# Patient Record
Sex: FEMALE | Race: WHITE | Employment: UNEMPLOYED | URBAN - METROPOLITAN AREA
[De-identification: names, ages, dates, MRNs, and addresses within clinical notes are randomized per-mention and may not be internally consistent; named-entity substitution may affect disease eponyms.]

---

## 2017-01-02 ENCOUNTER — HOSPITAL ENCOUNTER (OUTPATIENT)
Dept: PHYSICAL THERAPY | Age: 42
Discharge: HOME | End: 2017-01-02
Payer: MEDICARE

## 2017-01-02 PROCEDURE — 97110 THERAPEUTIC EXERCISES: CPT

## 2017-01-02 NOTE — PROGRESS NOTES
Alva Drew   (:1975) [facility] at  [address]  [phone]   [phone]       Outpatient PHYSICAL THERAPY: Daily Note  Fall Risk Score: 2 (? 5 = High Risk)  ICD-10: Treatment Diagnosis: Fibromyalgia (M79.7)                 Cervicalgia (M54.2)           Low back pain (M54.5)           REFERRING PHYSICIAN: Sven Paulino, *  MD Orders: Evaluate and treat   Return Physician Appointment: none specified  MEDICAL/REFERRING DIAGNOSIS: Fibromyalgia [M79.7]  DATE OF ONSET: chronic problem  PRIOR LEVEL OF FUNCTION: independent  PRECAUTIONS/ALLERGIES: buproprion, haldol, latuda, paxil, sulfa, wellbutrin, zoloft  ASSESSMENT:  ?????? ? ? This section established at most recent assessment??????????  Alva Drew is a 37 y.o. female who presents to physical therapy for chronic pain associated with a diagnosis of fibromyalgia. This session, she demonstrated decreased B UE/LE strength/endurance, pain with end range mobility at extremities, decreased functional mobility, and decreased activity tolerance. Pt may benefit from skilled PT to address the above listed deficits to improve ability to perform pain-free ADLs/IADLs and to improve overall quality of life prior to discharge. PROBLEM LIST (Impairments causing functional limitations):  1. Decreased Strength affecting function  2. Edema affecting function  3. Decreased ADL/Functional Activities  4. Decreased Flexibility/joint mobility  5. Decreased transfer abilities  6. Increased Pain affecting function  7. Decreased Activity tolerance   8. Decreased Pacing skills  9. Decreased Work Simplification/Energy Conservation techniques  10. Increased Fatigue affecting function  GOALS: (Goals have been discussed and agreed upon with patient.)  SHORT-TERM FUNCTIONAL GOALS: Time Frame: 4 weeks  1. Patient will be compliant with HEP.   2. Patient will have a decrease in average 24 hour pain level to 4/10 in order to improve functional mobility. 3. Patient will have a decrease on the FIQ to 79 in order to improve their quality of life. 4. Patient will have an increase on the 6 minute walk to 1,200 feet in order to indicate improve endurance. 5. Pataient will verbalize 3 ways to improve body mechanics in order to reduce pain. DISCHARGE GOALS: Time Frame: 8 weeks  1. Patient will be independent with HEP. 2. Patient will have a decrease in average 24 hour pain level to 3/10 in order to improve functional mobility. 3. Patient will have a decrease on the FIQ to 74 in order to improve their quality of life. 4. Patient will have a decrease on the fatigue scale to 67 in order to complete ADLs and IADLs. 5. Patient will have an increase on the 6 minute walk to 1,300 feet in order to demonstrate improved endurance. REHABILITATION POTENTIAL FOR STATED GOALS: [facility] OF CARE:  INTERVENTIONS PLANNED: (Benefits and precautions of physical therapy have been discussed with the patient.)  1. balance exercise  2. bed mobility  3. cold  4. family education  5. gait training  6. heat  7. home exercise program (HEP)  8. manual therapy  9. neuromuscular re-education/strengthening  10. range of motion: active/assisted/passive  11. therapeutic activities  12. therapeutic exercise/strengthening  13. transcutaneous electrical nerve stimulation (TENs)  14. transfer training  15. ultrasound  16. Aquatic therapy  TREATMENT PLAN EFFECTIVE DATES: 12/6/2016 TO 3/6/2016  FREQUENCY/DURATION: Follow patient 2 times a week for 8 weeks to address above goals. Regarding Alva Drew's therapy, I certify that the treatment plan above will be carried out by a therapist or under their direction.   Thank you for this referral,  Mustapha Porter DPT     Referring Physician Signature: Cindi Alves, *          Date           SUBJECTIVE:  History of Present Injury/Illness (Reason for Referral): Pt states she started having chronic pain after she got thrown off of horse when she was in her late 25s. She was diagnosed with fibromyalgia 2 years ago. Pt states she has flare-ups of fibromyalgia when it gets cold (hands, hips, arms, scapular regions), but her lower back hurts all the time. Entire back hurts all the time. States she sometimes gets shooting pains down her anterior legs that start at her hips. Pt states fibromyalgia symptoms significantly worsened 4 months ago when her dad  and she was admitted to [facility]. Pain at worst is 9/10 (aggravating activities include standing/walking for prolonged periods of time, sitting down on surfaces that are too hard/soft, going up/down stairs). Pain at best is almost 0/10 (eases pain with heat, OTC and prescribed pain medications). Present Symptoms: 5/10 pain in B hips (posterior); states it was 8/10 pain this morning; states they started hurting really badly on 2016 ; states she has been taking excedrin and ibuprofen; states the longer she is on her feet (standing/walking), the more her back and hips hurt     Dominant Side: right  Past Medical History: anxiety, bipolar disorder, depression, endocrine disease (hypothyroid), gastrointestinal disorder (esophageal ulcer), hypothyroidism, psychiatric disorder (bipolar), seizures    Current Medications: updated list in paper chart  Current Outpatient Prescriptions on File Prior to Encounter   Medication Sig Dispense Refill    ciprofloxacin HCl (CIPRO) 500 mg tablet Take 1 Tab by mouth two (2) times a day. 20 Tab 0    pantoprazole (PROTONIX) 40 mg tablet Take 40 mg by mouth daily.  QUEtiapine (SEROQUEL) 100 mg tablet Take 150 mg by mouth nightly.  folic acid (FOLVITE) 1 mg tablet Take  by mouth daily.  risperiDONE (RISPERDAL) 2 mg tablet Take 2 mg by mouth nightly.  clonazePAM (KLONOPIN) 0.5 mg tablet Take 0.5 mg by mouth three (3) times daily.       gabapentin (NEURONTIN) 300 mg capsule Take 300 mg by mouth three (3) times daily as needed.  carBAMazepine XR (TEGRETOL XR) 200 mg SR tablet Take 200 mg by mouth two (2) times a day. 200 mg in am; 400 mg at night      meloxicam (MOBIC) 15 mg tablet Take 15 mg by mouth daily.  lamotrigine (LAMICTAL) 100 mg tablet Take 200 mg by mouth daily.  PRAZOSIN 4 mg by Does Not Apply route nightly. Does not know the dose takes 3 pills a day      lithium carbonate (LITHOBID) 150 mg capsule Take 600 mg by mouth two (2) times daily (with meals).  SYNTHROID 75 mcg Tab take 75 mcg by mouth daily. No current facility-administered medications on file prior to encounter. Date Last Reviewed: 1/2/2017   Social History/Home Situation: 15 steps unilateral railing to get into apartment   Work/Activity History: on disability (in 2000)  OBJECTIVE:  Initial assessment on 12/6/2016  Tool Used: Fibromyalgia Impact Questionnaire  Score:  Initial: 84.84 Most Recent: X (Date: -- )   Interpretation of Score: <40 = Mild Fibromyalgia Syndrome   40-60 = Moderate Fibromyalgia Syndrome   >60-70 = Severe Fibromyalgia Syndrome  Score 0 1-18 19-37 38-57 58-77 78-96 97   Modifier CH CI CJ CK CL CM CN     ? Mobility - Walking and Moving Around:     - CURRENT STATUS: CM - 80%-99% impaired, limited or restricted    - GOAL STATUS:  CL - 60%-79% impaired, limited or restricted    - D/C STATUS:  ---------------To be determined---------------  [place] / [place] / Product Type: Medicare /      Outcome Measure: Tool Used: Modified Fatigue Impact Scale  Score:  Initial: 77 Most Recent:   Interpretation of Score: The higher the score equates to a higher level of perceived fatigue. Outcome Measure:    Tool Used: Beck Depression Index  Score:  Initial: 28 Most Recent:   Interpretation of Score:  0-9 = No Depression   10-19 = Mild Depression   19-29 = Moderate Depression   >30 = Severe Depression    Observation/Orthostatic Postural Assessment: kyphotic posture, forward head, rounded shoulders  Palpation: significant pain with slight pressure along cervical and lower thoracic and lumbar paraspinals and vertebrae  ROM: WFL but painful at end range         Strength:   Shoulder flexion 4-/5 (pulling pain in center of neck)  Shoulder abduction 4+/5 (pulling pain in center of neck)  Elbow flexion 5/5 (pulling pain in center of neck)  Elbow extension 5/5 (pulling pain in center of neck) Hip flexion 4+/5 (pain in neck due to pt bracing)  Hip abduction NT  Hip extension NT  Knee extension 5/5 (slight pain in anterior R thigh)  Knee flexion NT  Dorsiflexion 4+/5  Plantarflexion 4+/5         Neurological Screen:   Myotomes: WFL  Dermatomes: pt reporting occasional tingling in B hands    Functional Mobility: Patient ambulated 1,072 eet during 6 minute walk test with 0 rest breaks. Balance: good in sitting; good in standing  TREATMENT:   (In addition to Assessment/Re-Assessment sessions the following treatments were rendered)  Therapeutic Exercise: ( 35 minutes):  Exercises per grid below to improve mobility, strength, balance and dynamic movement of arm - bilateral and leg - bilateral to improve functional mobility. Required minimal visual, verbal and manual cues to promote proper body alignment, promote proper body posture and promote proper body mechanics. Progressed resistance, range, repetitions and complexity of movement as indicated.     Date:  12/12/2016 Date:  12/19/2016 Date:  1/2/2017   Activity/Exercise Parameters Parameters    Nustep L2 resistance with B LEs and UEs for 10 minutes to improve blood flow and mobility L2 resistance with B LEs and UEs for 10 minutes to improve blood flow and mobility L2 resistance with B LEs and UEs for 10 minutes to improve blood flow and mobility   LTR in hooklying 20 reps/1 set each direction with 2-3 second hold 20 reps/1 set each direction with 2-3 second hold 20 reps/1 set each direction with 2-3 second hold Hamstring stretch in supine 30 second hold x 3 reps each direction; passive stretch by therapist 30 second hold x 3 reps each direction; passive stretch by therapist 30 second hold x 3 reps each direction; passive stretch by therapist   Piriformis stretch in supine 30 second hold x 3 reps each direction; passive stretch by therapist 30 second hold x 3 reps each direction; passive stretch by therapist 30 second hold x 3 reps each direction; passive stretch by therapist   Posterior pelvic tilt in hooklying 20 reps/1 set with minimal tactile cues for correct movement; 2-3 second hold 20 reps/1 set with minimal tactile cues for correct movement; 2-3 second hold    Transversus abdominus (TA) activation 20 reps/1 set with 2-3 second hold     Bridging in hooklying 15 reps/1 set with posterior pelvic tilts with cues for 2-3 second hold at top 15 reps/1 set with posterior pelvic tilts with cues for 2-3 second hold at top    Postural exercises in sitting  Cues to obtain/maintain upright posture    Abdominal breathing in hooklying  10 reps with cues to breathe in through nose and out through mouth with cues to let belly rise/fall accordingly 10 reps with cues to breathe in through nose and out through mouth with cues to let belly rise/fall accordingly                 HEP: As above; handouts given to patient for all exercises. Patient/Family Education:     [x] Benefits of Exercise    [x] Pain Management    [x] Nutrition    [] Improving sleep    [] Body Mechanics    [] Fatigue    [] Grief/Depression/Stress   Therapeutic Modalities:   ____________________________________________________________________________________________________________  Treatment Assessment:  Pt demonstrating slightly reduced pain after stretches this session. Pt had increased malaise this session, and was trying to hold back tears.  Encouraged pt to not hold back her emotions, and to talk to therapist or family member(s) to avoid holding her emotions in.  Pain at end of treatment: 4/10 pain at end of treatment     Progression/Medical Necessity:   · Patient is expected to demonstrate progress in strength, range of motion, balance and functional technique to improve ability to perform pain-free ADLs/IADLs and to improve overall quality of life. · Patient demonstrates good rehab potential due to higher previous functional level. Compliance with Program/Exercises: Will assess as treatment progresses. Reason for Continuation of Services/Other Comments:  · Patient continues to require modification of therapeutic interventions to increase complexity of exercises. Recommendations/Intent for next treatment session: \"Treatment next visit will focus on advancements to more challenging activities and reduction in assistance provided\".   bFIBRO PROGRAM  Total Treatment Duration:  PT Patient Time In/Time Out  Time In: 1345  Time Out: 615 Santino Aponte DPT

## 2017-01-03 ENCOUNTER — HOSPITAL ENCOUNTER (OUTPATIENT)
Dept: PHYSICAL THERAPY | Age: 42
Discharge: HOME | End: 2017-01-03
Payer: MEDICARE

## 2017-01-03 PROCEDURE — 97113 AQUATIC THERAPY/EXERCISES: CPT

## 2017-01-03 NOTE — PROGRESS NOTES
Alva Drew   (:1975) [facility] at  [address]  [phone]   [phone]       Outpatient PHYSICAL THERAPY: Daily Note and Aquatic Note  Fall Risk Score: 2 (? 5 = High Risk)  ICD-10: Treatment Diagnosis: Fibromyalgia (M79.7)                 Cervicalgia (M54.2)           Low back pain (M54.5)           REFERRING PHYSICIAN: Susannah Hernandez MD Orders: Evaluate and treat   Return Physician Appointment: none specified  MEDICAL/REFERRING DIAGNOSIS: Fibromyalgia [M79.7]  DATE OF ONSET: chronic problem  PRIOR LEVEL OF FUNCTION: independent  PRECAUTIONS/ALLERGIES: buproprion, haldol, latuda, paxil, sulfa, wellbutrin, zoloft  ASSESSMENT:  ?????? ? ? This section established at most recent assessment??????????  Alva Drew is a 37 y.o. female who presents to physical therapy for chronic pain associated with a diagnosis of fibromyalgia. This session, she demonstrated decreased B UE/LE strength/endurance, pain with end range mobility at extremities, decreased functional mobility, and decreased activity tolerance. Pt may benefit from skilled PT to address the above listed deficits to improve ability to perform pain-free ADLs/IADLs and to improve overall quality of life prior to discharge. PROBLEM LIST (Impairments causing functional limitations):  1. Decreased Strength affecting function  2. Edema affecting function  3. Decreased ADL/Functional Activities  4. Decreased Flexibility/joint mobility  5. Decreased transfer abilities  6. Increased Pain affecting function  7. Decreased Activity tolerance   8. Decreased Pacing skills  9. Decreased Work Simplification/Energy Conservation techniques  10. Increased Fatigue affecting function  GOALS: (Goals have been discussed and agreed upon with patient.)  SHORT-TERM FUNCTIONAL GOALS: Time Frame: 4 weeks  1. Patient will be compliant with HEP.   2. Patient will have a decrease in average 24 hour pain level to 4/10 in order to improve functional mobility. 3. Patient will have a decrease on the FIQ to 79 in order to improve their quality of life. 4. Patient will have an increase on the 6 minute walk to 1,200 feet in order to indicate improve endurance. 5. Pataient will verbalize 3 ways to improve body mechanics in order to reduce pain. DISCHARGE GOALS: Time Frame: 8 weeks  1. Patient will be independent with HEP. 2. Patient will have a decrease in average 24 hour pain level to 3/10 in order to improve functional mobility. 3. Patient will have a decrease on the FIQ to 74 in order to improve their quality of life. 4. Patient will have a decrease on the fatigue scale to 67 in order to complete ADLs and IADLs. 5. Patient will have an increase on the 6 minute walk to 1,300 feet in order to demonstrate improved endurance. REHABILITATION POTENTIAL FOR STATED GOALS: [facility] OF CARE:  INTERVENTIONS PLANNED: (Benefits and precautions of physical therapy have been discussed with the patient.)  1. balance exercise  2. bed mobility  3. cold  4. family education  5. gait training  6. heat  7. home exercise program (HEP)  8. manual therapy  9. neuromuscular re-education/strengthening  10. range of motion: active/assisted/passive  11. therapeutic activities  12. therapeutic exercise/strengthening  13. transcutaneous electrical nerve stimulation (TENs)  14. transfer training  15. ultrasound  16. Aquatic therapy  TREATMENT PLAN EFFECTIVE DATES: 12/6/2016 TO 3/6/2016  FREQUENCY/DURATION: Follow patient 2 times a week for 8 weeks to address above goals. Regarding Alva Drew's therapy, I certify that the treatment plan above will be carried out by a therapist or under their direction.   Thank you for this referral,  Meg Riddle PTA     Referring Physician Signature: Mikel Garcia, *          Date           SUBJECTIVE:  History of Present Injury/Illness (Reason for Referral): Pt states she started having chronic pain after she got thrown off of horse when she was in her late 25s. She was diagnosed with fibromyalgia 2 years ago. Pt states she has flare-ups of fibromyalgia when it gets cold (hands, hips, arms, scapular regions), but her lower back hurts all the time. Entire back hurts all the time. States she sometimes gets shooting pains down her anterior legs that start at her hips. Pt states fibromyalgia symptoms significantly worsened 4 months ago when her dad  and she was admitted to [facility]. Pain at worst is 9/10 (aggravating activities include standing/walking for prolonged periods of time, sitting down on surfaces that are too hard/soft, going up/down stairs). Pain at best is almost 0/10 (eases pain with heat, OTC and prescribed pain medications). Present Symptoms: Patient reports her pain to be 4/10. Patient is very talkative and in an extremely good mood per patient. Patient presents laughing and smiling. Dominant Side: right  Past Medical History: anxiety, bipolar disorder, depression, endocrine disease (hypothyroid), gastrointestinal disorder (esophageal ulcer), hypothyroidism, psychiatric disorder (bipolar), seizures    Current Medications: updated list in paper chart  Current Outpatient Prescriptions on File Prior to Encounter   Medication Sig Dispense Refill    ciprofloxacin HCl (CIPRO) 500 mg tablet Take 1 Tab by mouth two (2) times a day. 20 Tab 0    pantoprazole (PROTONIX) 40 mg tablet Take 40 mg by mouth daily.  QUEtiapine (SEROQUEL) 100 mg tablet Take 150 mg by mouth nightly.  folic acid (FOLVITE) 1 mg tablet Take  by mouth daily.  risperiDONE (RISPERDAL) 2 mg tablet Take 2 mg by mouth nightly.  clonazePAM (KLONOPIN) 0.5 mg tablet Take 0.5 mg by mouth three (3) times daily.  gabapentin (NEURONTIN) 300 mg capsule Take 300 mg by mouth three (3) times daily as needed.         carBAMazepine XR (TEGRETOL XR) 200 mg SR tablet Take 200 mg by mouth two (2) times a day. 200 mg in am; 400 mg at night      meloxicam (MOBIC) 15 mg tablet Take 15 mg by mouth daily.  lamotrigine (LAMICTAL) 100 mg tablet Take 200 mg by mouth daily.  PRAZOSIN 4 mg by Does Not Apply route nightly. Does not know the dose takes 3 pills a day      lithium carbonate (LITHOBID) 150 mg capsule Take 600 mg by mouth two (2) times daily (with meals).  SYNTHROID 75 mcg Tab take 75 mcg by mouth daily. No current facility-administered medications on file prior to encounter. Date Last Reviewed: 1/3/2017   Social History/Home Situation: 15 steps unilateral railing to get into apartment   Work/Activity History: on disability (in 2000)  OBJECTIVE:  Initial assessment on 12/6/2016  Tool Used: Fibromyalgia Impact Questionnaire  Score:  Initial: 84.84 Most Recent: X (Date: -- )   Interpretation of Score: <40 = Mild Fibromyalgia Syndrome   40-60 = Moderate Fibromyalgia Syndrome   >60-70 = Severe Fibromyalgia Syndrome  Score 0 1-18 19-37 38-57 58-77 78-96 97   Modifier CH CI CJ CK CL CM CN     ? Mobility - Walking and Moving Around:     - CURRENT STATUS: CM - 80%-99% impaired, limited or restricted    - GOAL STATUS:  CL - 60%-79% impaired, limited or restricted    - D/C STATUS:  ---------------To be determined---------------  [place] / [place] AND [place] / Product Type: Medicare /      Outcome Measure: Tool Used: Modified Fatigue Impact Scale  Score:  Initial: 77 Most Recent:   Interpretation of Score: The higher the score equates to a higher level of perceived fatigue. Outcome Measure:    Tool Used: Randle Depression Index  Score:  Initial: 28 Most Recent:   Interpretation of Score:  0-9 = No Depression   10-19 = Mild Depression   19-29 = Moderate Depression   >30 = Severe Depression    Observation/Orthostatic Postural Assessment: kyphotic posture, forward head, rounded shoulders  Palpation: significant pain with slight pressure along cervical and lower thoracic and lumbar paraspinals and vertebrae  ROM: WFL but painful at end range         Strength:   Shoulder flexion 4-/5 (pulling pain in center of neck)  Shoulder abduction 4+/5 (pulling pain in center of neck)  Elbow flexion 5/5 (pulling pain in center of neck)  Elbow extension 5/5 (pulling pain in center of neck) Hip flexion 4+/5 (pain in neck due to pt bracing)  Hip abduction NT  Hip extension NT  Knee extension 5/5 (slight pain in anterior R thigh)  Knee flexion NT  Dorsiflexion 4+/5  Plantarflexion 4+/5         Neurological Screen:   Myotomes: WFL  Dermatomes: pt reporting occasional tingling in B hands    Functional Mobility: Patient ambulated 1,072 eet during 6 minute walk test with 0 rest breaks. Balance: good in sitting; good in standing  TREATMENT:  Aquatic Therapy (50 minutes): Aquatic treatment performed per flow grid for Decreased muscle strength and Decreased endurance. Cues provided for alignment and to breathe.       Aquatic Exercise Log       Date  12-13-16 Date  12-20-16 Date  12-27-16 Date  1-3-17 Date     Activity/ Exercise Parameters Parameters Parameters Parameters Parameters   Walking forward 2 laps with HHA 2 laps  2 laps 2 laps    Walking backward 2 laps with HHA 2 laps  2 laps 2 laps    Walking sideways 2 laps with HHA 2 laps  2 laps 2 laps      Marching 2 laps with HHA 2 laps 2 laps 2 laps      Goose Step 2 laps with HHA 2 laps 2 laps 2 laps      Tip toes   2 laps 2 laps      Heels          Lunges        Side step squats        LE Exercises noodle noodle noodle noodle      Hip Flex/Ext Marching x 10 B Marching x 10 B Marching x 12 B Marching x 15 B      Hip Abd/Add X 10 B X 10 B X 12 B X 15 B      Hip IR/ER          Calf raises          Knee Flex          Squats          Leg Circles          Step Ups        UE Exercises noodle noodle noodle noodle      Squeeze In X 10 B X 10 B X 12 B X 15 B      Push Down X 10 B X 10 B X 12 B X 15 B      Pull Down X 10 B X 10 B  X 15 B      Bicep/Tricep    X 10 B    Rows/Press outs  Push ups x 10 B Push ups x 12 B Push ups   X 15   Resisted press outs and rows x 15     Chi Positions          Trunk Rotation    With noodle x 10     Deep H2O/ Noodles 4 to 5 feet with noodles 4-5 feet with noodles 4-5 feet shallow      Stabilization Core stability         Arms only   Straddle - 2 laps Straddle - 2 laps        Legs only Under arms- bicycle x 2 minutes Straddle - 4 laps with arms and legs  Under arms- bicycle Straddle - 2 laps    Both - 2 laps Straddle - 2 laps    Both - 2 laps    [place] 2 x 20  2 x 20  X 20        Scissors 2 x 20  2 x 20  X 20        Crab walk  2 laps 2 laps 2 laps  Clams x 15 B  Reverse clams x 15 B    Lower abdominal   work  X 10  2 sequences x 10 each way 2 sequences x 10 each way       Cardio          Jogging  While punching noodles down by sides      Lap   Swimming          Stretches []  In [place]  [x]  Whirlpool* [x]  In Pool  []  Whirlpool* []  In Pool  [x]  Whirlpool* []  In Pool  [x]  Whirlpool* []  In Pool  []  Whirlpool*     Hamstrings X 3 B X 3 B X 3 B X 3 B      Heelcords X 3 B X 3 B X 3 B X 3 B      Piriformis X 3 B X 3 B X 3 B X 3 B      Neck                  * For increased soft tissue extensibility a warm water (over 100°F) environment was utilized. Supervision/monitoring was provided at all times. ____________________________________________________________________________________________________________  Treatment Assessment:  Patient reports her pain at end of session to be 3-4/10. No fatigue noted. Continue per plan of care. Progression/Medical Necessity:   · Patient is expected to demonstrate progress in strength, range of motion, balance and functional technique to improve ability to perform pain-free ADLs/IADLs and to improve overall quality of life. · Patient demonstrates good rehab potential due to higher previous functional level. Compliance with Program/Exercises:  Will assess as treatment progresses. Reason for Continuation of Services/Other Comments:  · Patient continues to require modification of therapeutic interventions to increase complexity of exercises. Recommendations/Intent for next treatment session: \"Treatment next visit will focus on advancements to more challenging activities and reduction in assistance provided\".   FIBRO PROGRAM  Total Treatment Duration: 50 minutes   PT Patient Time In/Time Out  Time In: 1310 (patient was 10 minutes late today)  Time Out: 179 [facility], [facility]   1/3/2017

## 2017-01-09 ENCOUNTER — HOSPITAL ENCOUNTER (OUTPATIENT)
Dept: PHYSICAL THERAPY | Age: 42
Discharge: HOME | End: 2017-01-09
Payer: MEDICARE

## 2017-01-09 PROCEDURE — G8978 MOBILITY CURRENT STATUS: HCPCS

## 2017-01-09 PROCEDURE — 97110 THERAPEUTIC EXERCISES: CPT

## 2017-01-09 PROCEDURE — G8979 MOBILITY GOAL STATUS: HCPCS

## 2017-01-09 NOTE — PROGRESS NOTES
Alva Drew   (:1975) [facility] at  [address]  [phone]   [phone]       Outpatient PHYSICAL THERAPY: Progress Report  Fall Risk Score: 2 (? 5 = High Risk)  ICD-10: Treatment Diagnosis: Fibromyalgia (M79.7)                 Cervicalgia (M54.2)           Low back pain (M54.5)           REFERRING PHYSICIAN: Gonzalo Yo, *  MD Orders: Evaluate and treat   Return Physician Appointment: none specified  MEDICAL/REFERRING DIAGNOSIS: Fibromyalgia [M79.7]  DATE OF ONSET: chronic problem  PRIOR LEVEL OF FUNCTION: independent  PRECAUTIONS/ALLERGIES: buproprion, haldol, latuda, paxil, sulfa, wellbutrin, zoloft  ASSESSMENT:  ?????? ? ? This section established at most recent assessment??????????  Alva Drew has now attended 9 physical therapy sessions for chronic pain associated with a diagnosis of fibromyalgia. This session, she demonstrated improved activity tolerance and improved functional mobility. Despite the above listed improvements, she continues to demonstrate decreased B UE/LE strength/endurance, pain with end range mobility at extremities, decreased functional mobility, and decreased activity tolerance. Pt may continue to benefit from skilled PT to address the above listed deficits to improve ability to perform pain-free ADLs/IADLs and to improve overall quality of life prior to discharge. PROBLEM LIST (Impairments causing functional limitations):  1. Decreased Strength affecting function  2. Edema affecting function  3. Decreased ADL/Functional Activities  4. Decreased Flexibility/joint mobility  5. Decreased transfer abilities  6. Increased Pain affecting function  7. Decreased Activity tolerance   8. Decreased Pacing skills  9. Decreased Work Simplification/Energy Conservation techniques  10.  Increased Fatigue affecting function  GOALS: (Goals have been discussed and agreed upon with patient.)  SHORT-TERM FUNCTIONAL GOALS: Time Frame: 4 weeks  1. Patient will be compliant with HEP. (MET 1/9/2017)  2. Patient will have a decrease in average 24 hour pain level to 4/10 in order to improve functional mobility. (PROGRESSING 1/9/2017)  3. Patient will have a decrease on the FIQ to 79 in order to improve their quality of life. (PROGRESSING 1/9/2017)  4. Patient will have an increase on the 6 minute walk to 1,200 feet in order to indicate improve endurance. (MET 1/9/2017)  5. Pataient will verbalize 3 ways to improve body mechanics in order to reduce pain. (PROGRESSING 1/9/2017)  DISCHARGE GOALS: Time Frame: 8 weeks  1. Patient will be independent with HEP. 2. Patient will have a decrease in average 24 hour pain level to 3/10 in order to improve functional mobility. 3. Patient will have a decrease on the FIQ to 74 in order to improve their quality of life. 4. Patient will have a decrease on the fatigue scale to 67 in order to complete ADLs and IADLs. 5. Patient will have an increase on the 6 minute walk to 1,300 feet in order to demonstrate improved endurance. REHABILITATION POTENTIAL FOR STATED GOALS: Yanely Braden OF CARE:  INTERVENTIONS PLANNED: (Benefits and precautions of physical therapy have been discussed with the patient.)  1. balance exercise  2. bed mobility  3. cold  4. family education  5. gait training  6. heat  7. home exercise program (HEP)  8. manual therapy  9. neuromuscular re-education/strengthening  10. range of motion: active/assisted/passive  11. therapeutic activities  12. therapeutic exercise/strengthening  13. transcutaneous electrical nerve stimulation (TENs)  14. transfer training  15. ultrasound  16. Aquatic therapy  TREATMENT PLAN EFFECTIVE DATES: 12/6/2016 TO 3/6/2016  FREQUENCY/DURATION: Follow patient 2 times a week for 8 weeks to address above goals.   Regarding Alva Drew's therapy, I certify that the treatment plan above will be carried out by a therapist or under their direction. Thank you for this referral,  Mundo Garrido, DPT     Referring Physician Signature: Taina Lesly, *          Date           SUBJECTIVE:  History of Present Injury/Illness (Reason for Referral): Pt states she started having chronic pain after she got thrown off of horse when she was in her late 25s. She was diagnosed with fibromyalgia 2 years ago. Pt states she has flare-ups of fibromyalgia when it gets cold (hands, hips, arms, scapular regions), but her lower back hurts all the time. Entire back hurts all the time. States she sometimes gets shooting pains down her anterior legs that start at her hips. Pt states fibromyalgia symptoms significantly worsened 4 months ago when her dad  and she was admitted to [facility]. Pain at worst is 9/10 (aggravating activities include standing/walking for prolonged periods of time, sitting down on surfaces that are too hard/soft, going up/down stairs). Pain at best is almost 0/10 (eases pain with heat, OTC and prescribed pain medications). Present Symptoms: 3/10 pain today     Dominant Side: right  Past Medical History: anxiety, bipolar disorder, depression, endocrine disease (hypothyroid), gastrointestinal disorder (esophageal ulcer), hypothyroidism, psychiatric disorder (bipolar), seizures    Current Medications: updated list in paper chart  Current Outpatient Prescriptions on File Prior to Encounter   Medication Sig Dispense Refill    ciprofloxacin HCl (CIPRO) 500 mg tablet Take 1 Tab by mouth two (2) times a day. 20 Tab 0    pantoprazole (PROTONIX) 40 mg tablet Take 40 mg by mouth daily.  QUEtiapine (SEROQUEL) 100 mg tablet Take 150 mg by mouth nightly.  folic acid (FOLVITE) 1 mg tablet Take  by mouth daily.  risperiDONE (RISPERDAL) 2 mg tablet Take 2 mg by mouth nightly.  clonazePAM (KLONOPIN) 0.5 mg tablet Take 0.5 mg by mouth three (3) times daily.       gabapentin (NEURONTIN) 300 mg capsule Take 300 mg by mouth three (3) times daily as needed.  carBAMazepine XR (TEGRETOL XR) 200 mg SR tablet Take 200 mg by mouth two (2) times a day. 200 mg in am; 400 mg at night      meloxicam (MOBIC) 15 mg tablet Take 15 mg by mouth daily.  lamotrigine (LAMICTAL) 100 mg tablet Take 200 mg by mouth daily.  PRAZOSIN 4 mg by Does Not Apply route nightly. Does not know the dose takes 3 pills a day      lithium carbonate (LITHOBID) 150 mg capsule Take 600 mg by mouth two (2) times daily (with meals).  SYNTHROID 75 mcg Tab take 75 mcg by mouth daily. No current facility-administered medications on file prior to encounter. Date Last Reviewed: 1/9/2017   Social History/Home Situation: 15 steps unilateral railing to get into apartment   Work/Activity History: on disability (in 2000)  OBJECTIVE:  Reassessment on 1/9/2017  Tool Used: Fibromyalgia Impact Questionnaire  Score:  Initial: 84.84 Most Recent: 81.26 (Date: 1/9/2017)   Interpretation of Score: <40 = Mild Fibromyalgia Syndrome   40-60 = Moderate Fibromyalgia Syndrome   >60-70 = Severe Fibromyalgia Syndrome  Score 0 1-18 19-37 38-57 58-77 78-96 97   Modifier CH CI CJ CK CL CM CN     ? Mobility - Walking and Moving Around:     - CURRENT STATUS: CM - 80%-99% impaired, limited or restricted    - GOAL STATUS:  CL - 60%-79% impaired, limited or restricted    - D/C STATUS:  ---------------To be determined---------------  [place] / [place] / Product Type: Medicare /      Outcome Measure: Tool Used: Modified Fatigue Impact Scale  Score:  Initial: 77 Most Recent: 64 (1/9/2017)   Interpretation of Score: The higher the score equates to a higher level of perceived fatigue. Outcome Measure:    Tool Used: Beck Depression Index  Score:  Initial: 28 Most Recent: 28 (1/9/2017)   Interpretation of Score:  0-9 = No Depression   10-19 = Mild Depression   19-29 = Moderate Depression   >30 = Severe Depression    Observation/Orthostatic Postural Assessment: kyphotic posture, forward head, rounded shoulders  Palpation: significant pain with slight pressure along cervical and lower thoracic and lumbar paraspinals and vertebrae  ROM: WFL but painful at end range         Strength:   Shoulder flexion 4-/5 (pulling pain in center of neck)  Shoulder abduction 4+/5 (pulling pain in center of neck)  Elbow flexion 5/5 (pulling pain in center of neck)  Elbow extension 5/5 (pulling pain in center of neck) Hip flexion 4+/5 (pain in neck due to pt bracing)  Hip abduction NT  Hip extension NT  Knee extension 5/5 (slight pain in anterior R thigh)  Knee flexion NT  Dorsiflexion 4+/5  Plantarflexion 4+/5         Neurological Screen:   Myotomes: WFL  Dermatomes: pt reporting occasional tingling in B hands    Functional Mobility: Patient ambulated 1,290 feet during 6 minute walk test with 0 rest breaks - assessed on 1/9/2017. Balance: good in sitting; good in standing  TREATMENT:   (In addition to Assessment/Re-Assessment sessions the following treatments were rendered)  Therapeutic Exercise: ( 40 minutes):  Exercises per grid below to improve mobility, strength, balance and dynamic movement of arm - bilateral and leg - bilateral to improve functional mobility. Required minimal visual, verbal and manual cues to promote proper body alignment, promote proper body posture and promote proper body mechanics. Progressed resistance, range, repetitions and complexity of movement as indicated.     Date:  12/19/2016 Date:  1/2/2017 Date:  1/9/2017   Activity/Exercise Parameters     Nustep L2 resistance with B LEs and UEs for 10 minutes to improve blood flow and mobility L2 resistance with B LEs and UEs for 10 minutes to improve blood flow and mobility L3 resistance with B LEs and UEs for 13 minutes to improve blood flow and mobility   LTR in hooklying 20 reps/1 set each direction with 2-3 second hold 20 reps/1 set each direction with 2-3 second hold 20 reps/1 set each direction with 2-3 second hold   Hamstring stretch in supine 30 second hold x 3 reps each direction; passive stretch by therapist 30 second hold x 3 reps each direction; passive stretch by therapist 30 second hold x 3 reps each direction; passive stretch by therapist   Piriformis stretch in supine 30 second hold x 3 reps each direction; passive stretch by therapist 30 second hold x 3 reps each direction; passive stretch by therapist    Posterior pelvic tilt in hooklying 20 reps/1 set with minimal tactile cues for correct movement; 2-3 second hold     Transversus abdominus (TA) activation      Bridging in hooklying 15 reps/1 set with posterior pelvic tilts with cues for 2-3 second hold at top     Postural exercises in sitting Cues to obtain/maintain upright posture     Abdominal breathing in hooklying 10 reps with cues to breathe in through nose and out through mouth with cues to let belly rise/fall accordingly 10 reps with cues to breathe in through nose and out through mouth with cues to let belly rise/fall accordingly    Cervical forward flexion stretch   30 second hold x 5 reps each side   Cervical lateral flexion stretch   30 second hold x 3 reps each side   Ambulation over level ground   6 minutes per assessment above   Postural exercises in sitting   Cues for more upright posture with less shoulder elevation in sitting     HEP: As above; handouts given to patient for all exercises. Patient/Family Education:     [x] Benefits of Exercise    [x] Pain Management    [x] Nutrition    [x] Improving sleep    [] Body Mechanics    [] Fatigue    [] Grief/Depression/Stress   Therapeutic Modalities:   ____________________________________________________________________________________________________________  Treatment Assessment:  See assessment above.   Pain at end of treatment: 6/10 pain at end of treatment (due to standing/walking)    Progression/Medical Necessity:   · Patient is expected to demonstrate progress in strength, range of motion, balance and functional technique to improve ability to perform pain-free ADLs/IADLs and to improve overall quality of life. · Patient demonstrates good rehab potential due to higher previous functional level. Compliance with Program/Exercises: Good compliance with attending scheduled sessions and performing HEP. Reason for Continuation of Services/Other Comments:  · Patient continues to require modification of therapeutic interventions to increase complexity of exercises. Recommendations/Intent for next treatment session: \"Treatment next visit will focus on advancements to more challenging activities and reduction in assistance provided\".   FIBRO PROGRAM  Total Treatment Duration:  PT Patient Time In/Time Out  Time In: 1350  Time Out: [address], [facility]

## 2017-01-10 ENCOUNTER — HOSPITAL ENCOUNTER (OUTPATIENT)
Dept: PHYSICAL THERAPY | Age: 42
Discharge: HOME | End: 2017-01-10
Payer: MEDICARE

## 2017-01-10 PROCEDURE — 97113 AQUATIC THERAPY/EXERCISES: CPT

## 2017-01-11 ENCOUNTER — APPOINTMENT (OUTPATIENT)
Dept: GENERAL RADIOLOGY | Age: 42
End: 2017-01-11
Attending: EMERGENCY MEDICINE
Payer: MEDICARE

## 2017-01-11 LAB
BASOPHILS # BLD AUTO: 0 K/UL (ref 0–0.2)
BASOPHILS # BLD: 0 % (ref 0–2)
DIFFERENTIAL METHOD BLD: ABNORMAL
EOSINOPHIL # BLD: 0.3 K/UL (ref 0–0.8)
EOSINOPHIL NFR BLD: 3 % (ref 0.5–7.8)
ERYTHROCYTE [DISTWIDTH] IN BLOOD BY AUTOMATED COUNT: 13.5 % (ref 11.9–14.6)
HCT VFR BLD AUTO: 44.4 % (ref 35.8–46.3)
HGB BLD-MCNC: 14.5 G/DL (ref 11.7–15.4)
IMM GRANULOCYTES # BLD: 0 K/UL (ref 0–0.5)
IMM GRANULOCYTES NFR BLD AUTO: 0.4 % (ref 0–5)
LYMPHOCYTES # BLD AUTO: 25 % (ref 13–44)
LYMPHOCYTES # BLD: 2.4 K/UL (ref 0.5–4.6)
MCH RBC QN AUTO: 31.9 PG (ref 26.1–32.9)
MCHC RBC AUTO-ENTMCNC: 32.7 G/DL (ref 31.4–35)
MCV RBC AUTO: 97.6 FL (ref 79.6–97.8)
MONOCYTES # BLD: 0.4 K/UL (ref 0.1–1.3)
MONOCYTES NFR BLD AUTO: 4 % (ref 4–12)
NEUTS SEG # BLD: 6.4 K/UL (ref 1.7–8.2)
NEUTS SEG NFR BLD AUTO: 68 % (ref 43–78)
PLATELET # BLD AUTO: 327 K/UL (ref 150–450)
PMV BLD AUTO: 8.8 FL (ref 10.8–14.1)
RBC # BLD AUTO: 4.55 M/UL (ref 4.05–5.25)
WBC # BLD AUTO: 9.5 K/UL (ref 4.3–11.1)

## 2017-01-11 PROCEDURE — 99285 EMERGENCY DEPT VISIT HI MDM: CPT | Performed by: EMERGENCY MEDICINE

## 2017-01-11 PROCEDURE — 80053 COMPREHEN METABOLIC PANEL: CPT | Performed by: EMERGENCY MEDICINE

## 2017-01-11 PROCEDURE — 93005 ELECTROCARDIOGRAM TRACING: CPT | Performed by: EMERGENCY MEDICINE

## 2017-01-11 PROCEDURE — 85025 COMPLETE CBC W/AUTO DIFF WBC: CPT | Performed by: EMERGENCY MEDICINE

## 2017-01-11 PROCEDURE — 81003 URINALYSIS AUTO W/O SCOPE: CPT | Performed by: EMERGENCY MEDICINE

## 2017-01-11 PROCEDURE — 84484 ASSAY OF TROPONIN QUANT: CPT | Performed by: EMERGENCY MEDICINE

## 2017-01-11 PROCEDURE — 71020 XR CHEST PA LAT: CPT

## 2017-01-11 PROCEDURE — 83690 ASSAY OF LIPASE: CPT | Performed by: EMERGENCY MEDICINE

## 2017-01-11 NOTE — PROGRESS NOTES
Alva Drew   (:1975) [facility] at  [address]  [phone]   [phone]       Outpatient PHYSICAL THERAPY: Daily Note and Aquatic Note  Fall Risk Score: 2 (? 5 = High Risk)  ICD-10: Treatment Diagnosis: Fibromyalgia (M79.7)                 Cervicalgia (M54.2)           Low back pain (M54.5)           REFERRING PHYSICIAN: Susannah Chaudhary MD Orders: Evaluate and treat   Return Physician Appointment: none specified  MEDICAL/REFERRING DIAGNOSIS: Fibromyalgia [M79.7]  DATE OF ONSET: chronic problem  PRIOR LEVEL OF FUNCTION: independent  PRECAUTIONS/ALLERGIES: buproprion, haldol, latuda, paxil, sulfa, wellbutrin, zoloft  ASSESSMENT:  ?????? ? ? This section established at most recent assessment??????????  Alva Drew has now attended 9 physical therapy sessions for chronic pain associated with a diagnosis of fibromyalgia. This session, she demonstrated improved activity tolerance and improved functional mobility. Despite the above listed improvements, she continues to demonstrate decreased B UE/LE strength/endurance, pain with end range mobility at extremities, decreased functional mobility, and decreased activity tolerance. Pt may continue to benefit from skilled PT to address the above listed deficits to improve ability to perform pain-free ADLs/IADLs and to improve overall quality of life prior to discharge. PROBLEM LIST (Impairments causing functional limitations):  1. Decreased Strength affecting function  2. Edema affecting function  3. Decreased ADL/Functional Activities  4. Decreased Flexibility/joint mobility  5. Decreased transfer abilities  6. Increased Pain affecting function  7. Decreased Activity tolerance   8. Decreased Pacing skills  9. Decreased Work Simplification/Energy Conservation techniques  10.  Increased Fatigue affecting function  GOALS: (Goals have been discussed and agreed upon with patient.)  SHORT-TERM FUNCTIONAL GOALS: Time Frame: 4 weeks  1. Patient will be compliant with HEP. (MET 1/9/2017)  2. Patient will have a decrease in average 24 hour pain level to 4/10 in order to improve functional mobility. (PROGRESSING 1/9/2017)  3. Patient will have a decrease on the FIQ to 79 in order to improve their quality of life. (PROGRESSING 1/9/2017)  4. Patient will have an increase on the 6 minute walk to 1,200 feet in order to indicate improve endurance. (MET 1/9/2017)  5. Pataient will verbalize 3 ways to improve body mechanics in order to reduce pain. (PROGRESSING 1/9/2017)  DISCHARGE GOALS: Time Frame: 8 weeks  1. Patient will be independent with HEP. 2. Patient will have a decrease in average 24 hour pain level to 3/10 in order to improve functional mobility. 3. Patient will have a decrease on the FIQ to 74 in order to improve their quality of life. 4. Patient will have a decrease on the fatigue scale to 67 in order to complete ADLs and IADLs. 5. Patient will have an increase on the 6 minute walk to 1,300 feet in order to demonstrate improved endurance. REHABILITATION POTENTIAL FOR STATED GOALS: Genice Los OF CARE:  INTERVENTIONS PLANNED: (Benefits and precautions of physical therapy have been discussed with the patient.)  1. balance exercise  2. bed mobility  3. cold  4. family education  5. gait training  6. heat  7. home exercise program (HEP)  8. manual therapy  9. neuromuscular re-education/strengthening  10. range of motion: active/assisted/passive  11. therapeutic activities  12. therapeutic exercise/strengthening  13. transcutaneous electrical nerve stimulation (TENs)  14. transfer training  15. ultrasound  16. Aquatic therapy  TREATMENT PLAN EFFECTIVE DATES: 12/6/2016 TO 3/6/2016  FREQUENCY/DURATION: Follow patient 2 times a week for 8 weeks to address above goals.   Regarding Alva Drew's therapy, I certify that the treatment plan above will be carried out by a therapist or under their direction. Thank you for this referral,  Esther Chinchilla PTA     Referring Physician Signature: Juan Luis Cervantes, *          Date           SUBJECTIVE:  History of Present Injury/Illness (Reason for Referral): Pt states she started having chronic pain after she got thrown off of horse when she was in her late 25s. She was diagnosed with fibromyalgia 2 years ago. Pt states she has flare-ups of fibromyalgia when it gets cold (hands, hips, arms, scapular regions), but her lower back hurts all the time. Entire back hurts all the time. States she sometimes gets shooting pains down her anterior legs that start at her hips. Pt states fibromyalgia symptoms significantly worsened 4 months ago when her dad  and she was admitted to [facility]. Pain at worst is 9/10 (aggravating activities include standing/walking for prolonged periods of time, sitting down on surfaces that are too hard/soft, going up/down stairs). Pain at best is almost 0/10 (eases pain with heat, OTC and prescribed pain medications). Present Symptoms: 3/10 pain today     Dominant Side: right  Past Medical History: anxiety, bipolar disorder, depression, endocrine disease (hypothyroid), gastrointestinal disorder (esophageal ulcer), hypothyroidism, psychiatric disorder (bipolar), seizures    Current Medications: updated list in paper chart  Current Outpatient Prescriptions on File Prior to Encounter   Medication Sig Dispense Refill    ciprofloxacin HCl (CIPRO) 500 mg tablet Take 1 Tab by mouth two (2) times a day. 20 Tab 0    pantoprazole (PROTONIX) 40 mg tablet Take 40 mg by mouth daily.  QUEtiapine (SEROQUEL) 100 mg tablet Take 150 mg by mouth nightly.  folic acid (FOLVITE) 1 mg tablet Take  by mouth daily.  risperiDONE (RISPERDAL) 2 mg tablet Take 2 mg by mouth nightly.  clonazePAM (KLONOPIN) 0.5 mg tablet Take 0.5 mg by mouth three (3) times daily.       gabapentin (NEURONTIN) 300 mg capsule Take 300 mg by mouth three (3) times daily as needed.  carBAMazepine XR (TEGRETOL XR) 200 mg SR tablet Take 200 mg by mouth two (2) times a day. 200 mg in am; 400 mg at night      meloxicam (MOBIC) 15 mg tablet Take 15 mg by mouth daily.  lamotrigine (LAMICTAL) 100 mg tablet Take 200 mg by mouth daily.  PRAZOSIN 4 mg by Does Not Apply route nightly. Does not know the dose takes 3 pills a day      lithium carbonate (LITHOBID) 150 mg capsule Take 600 mg by mouth two (2) times daily (with meals).  SYNTHROID 75 mcg Tab take 75 mcg by mouth daily. No current facility-administered medications on file prior to encounter. Date Last Reviewed: 1/10/2017   Social History/Home Situation: 15 steps unilateral railing to get into apartment   Work/Activity History: on disability (in 2000)  OBJECTIVE:  Reassessment on 1/9/2017  Tool Used: Fibromyalgia Impact Questionnaire  Score:  Initial: 84.84 Most Recent: 81.26 (Date: 1/9/2017)   Interpretation of Score: <40 = Mild Fibromyalgia Syndrome   40-60 = Moderate Fibromyalgia Syndrome   >60-70 = Severe Fibromyalgia Syndrome  Score 0 1-18 19-37 38-57 58-77 78-96 97   Modifier CH CI CJ CK CL CM CN     ? Mobility - Walking and Moving Around:     - CURRENT STATUS: CM - 80%-99% impaired, limited or restricted    - GOAL STATUS:  CL - 60%-79% impaired, limited or restricted    - D/C STATUS:  ---------------To be determined---------------  [place] / [place] / Product Type: Medicare /      Outcome Measure: Tool Used: Modified Fatigue Impact Scale  Score:  Initial: 77 Most Recent: 64 (1/9/2017)   Interpretation of Score: The higher the score equates to a higher level of perceived fatigue. Outcome Measure:    Tool Used: Beck Depression Index  Score:  Initial: 28 Most Recent: 28 (1/9/2017)   Interpretation of Score:  0-9 = No Depression   10-19 = Mild Depression   19-29 = Moderate Depression   >30 = Severe Depression    Observation/Orthostatic Postural Assessment: kyphotic posture, forward head, rounded shoulders  Palpation: significant pain with slight pressure along cervical and lower thoracic and lumbar paraspinals and vertebrae  ROM: WFL but painful at end range         Strength:   Shoulder flexion 4-/5 (pulling pain in center of neck)  Shoulder abduction 4+/5 (pulling pain in center of neck)  Elbow flexion 5/5 (pulling pain in center of neck)  Elbow extension 5/5 (pulling pain in center of neck) Hip flexion 4+/5 (pain in neck due to pt bracing)  Hip abduction NT  Hip extension NT  Knee extension 5/5 (slight pain in anterior R thigh)  Knee flexion NT  Dorsiflexion 4+/5  Plantarflexion 4+/5         Neurological Screen:   Myotomes: WFL  Dermatomes: pt reporting occasional tingling in B hands    Functional Mobility: Patient ambulated 1,290 feet during 6 minute walk test with 0 rest breaks - assessed on 1/9/2017. Balance: good in sitting; good in standing  TREATMENT:     Aquatic Therapy (50 minutes): Aquatic treatment performed per flow grid for Decreased muscle strength and Decreased endurance.  Cues provided for alignment and to breathe.      Aquatic Exercise Log       Date  12-13-16 Date  12-20-16 Date  12-27-16 Date  1-3-17 Date  1-10-17   Activity/ Exercise Parameters Parameters Parameters Parameters Parameters   Walking forward 2 laps with HHA 2 laps  2 laps 2 laps  2 laps   Walking backward 2 laps with HHA 2 laps  2 laps 2 laps  2 laps   Walking sideways 2 laps with HHA 2 laps  2 laps 2 laps  2 laps      Marching 2 laps with HHA 2 laps 2 laps 2 laps  2 laps      Goose Step 2 laps with HHA 2 laps 2 laps 2 laps  2 laps      Tip toes     2 laps 2 laps  2 laps      Heels                Lunges             Side step squats             LE Exercises noodle noodle noodle noodle  noodle      Hip Flex/Ext Marching x 10 B Marching x 10 B Marching x 12 B Marching x 15 B  Marching x 20 B      Hip Abd/Add X 10 B X 10 B X 12 B X 15 B  x 20 B      Hip IR/ER                Calf raises                Knee Flex                Squats                Leg Circles                Step Ups             UE Exercises noodle noodle noodle noodle  noodle      Squeeze In X 10 B X 10 B X 12 B X 15 B  x 20 B      Push Down X 10 B X 10 B X 12 B X 15 B  x 20 B      Pull Down X 10 B X 10 B   X 15 B  x 20 B      Bicep/Tricep       X 10 B  x 20 B   Rows/Press outs   Push ups x 10 B Push ups x 12 B Push ups   X 15   Resisted press outs and rows x 15  push ups x 20     Chi Positions                Trunk Rotation       With noodle x 10      Deep H2O/ Noodles 4 to 5 feet with noodles 4-5 feet with noodles 4-5 feet shallow  shallow       Stabilization Core stability              Arms only     Straddle - 2 laps Straddle - 2 laps  straddle  - 2 laps      Legs only Under arms- bicycle x 2 minutes Straddle - 4 laps with arms and legs  Under arms- bicycle Straddle - 2 laps     Both - 2 laps Straddle - 2 laps     Both - 2 laps  straddle - 2 laps    Both - 2 laps   Cross   [place] 2 x 20  2 x 20  X 20           Scissors 2 x 20  2 x 20  X 20           Crab walk   2 laps 2 laps 2 laps  Clams x 15 B  Reverse clams x 15 B  2 laps   Clams x 20 B  Reverse clams x 20 B   Lower abdominal   work  X 10  2 sequences x 10 each way 2 sequences x 10 each way          Cardio                Jogging   While punching noodles down by sides         Lap   Swimming                Stretches [] In Pool  [x]  Whirlpool* [x] In Pool  [] Whirlpool* [] In Pool  [x] Whirlpool* [] In Pool  [x]  Whirlpool* [] In Pool  [x] Whirlpool*      Hamstrings X 3 B X 3 B X 3 B X 3 B X 3 B      Heelcords X 3 B X 3 B X 3 B X 3 B  x 3 B      Piriformis X 3 B X 3 B X 3 B X 3 B  x 3 B      Neck                              * For increased soft tissue extensibility a warm water (over 100°F) environment was utilized.  Supervision/monitoring was provided at all times.    ____________________________________________________________________________________________________________  Treatment Assessment:  Patient had no change in pain upon departure. No other complaints. Continue per Plan of care. Pain at end of treatment: 3/10    Progression/Medical Necessity:   · Patient is expected to demonstrate progress in strength, range of motion, balance and functional technique to improve ability to perform pain-free ADLs/IADLs and to improve overall quality of life. · Patient demonstrates good rehab potential due to higher previous functional level. Compliance with Program/Exercises: Good compliance with attending scheduled sessions and performing HEP. Reason for Continuation of Services/Other Comments:  · Patient continues to require modification of therapeutic interventions to increase complexity of exercises. Recommendations/Intent for next treatment session: \"Treatment next visit will focus on advancements to more challenging activities and reduction in assistance provided\".   FIBRO PROGRAM  Total Treatment Duration: 50 minutes   PT Patient Time In/Time Out  Time In: 1255  Time Out: [address] [facility] JUAN PABLO Delarosa  1/10/2017

## 2017-01-12 ENCOUNTER — HOSPITAL ENCOUNTER (EMERGENCY)
Age: 42
Discharge: HOME | End: 2017-01-12
Attending: EMERGENCY MEDICINE
Payer: MEDICARE

## 2017-01-12 VITALS
TEMPERATURE: 98.2 F | DIASTOLIC BLOOD PRESSURE: 91 MMHG | RESPIRATION RATE: 16 BRPM | BODY MASS INDEX: 40.67 KG/M2 | HEIGHT: 62 IN | HEART RATE: 80 BPM | OXYGEN SATURATION: 94 % | WEIGHT: 221 LBS | SYSTOLIC BLOOD PRESSURE: 194 MMHG

## 2017-01-12 DIAGNOSIS — R07.9 CHEST PAIN, UNSPECIFIED TYPE: Primary | ICD-10-CM

## 2017-01-12 DIAGNOSIS — K21.9 GASTROESOPHAGEAL REFLUX DISEASE, ESOPHAGITIS PRESENCE NOT SPECIFIED: ICD-10-CM

## 2017-01-12 LAB
ALBUMIN SERPL BCP-MCNC: 3.9 G/DL (ref 3.5–5)
ALBUMIN/GLOB SERPL: 1.3 {RATIO} (ref 1.2–3.5)
ALP SERPL-CCNC: 70 U/L (ref 50–136)
ALT SERPL-CCNC: 21 U/L (ref 12–65)
ANION GAP BLD CALC-SCNC: 9 MMOL/L (ref 7–16)
AST SERPL W P-5'-P-CCNC: 11 U/L (ref 15–37)
ATRIAL RATE: 77 BPM
BACTERIA URNS QL MICRO: 0 /HPF
BILIRUB SERPL-MCNC: 0.2 MG/DL (ref 0.2–1.1)
BUN SERPL-MCNC: 8 MG/DL (ref 6–23)
CALCIUM SERPL-MCNC: 9.4 MG/DL (ref 8.3–10.4)
CALCULATED P AXIS, ECG09: 74 DEGREES
CALCULATED R AXIS, ECG10: 83 DEGREES
CALCULATED T AXIS, ECG11: 59 DEGREES
CASTS URNS QL MICRO: NORMAL /LPF
CHLORIDE SERPL-SCNC: 108 MMOL/L (ref 98–107)
CO2 SERPL-SCNC: 25 MMOL/L (ref 21–32)
CREAT SERPL-MCNC: 0.5 MG/DL (ref 0.6–1)
DIAGNOSIS, 93000: NORMAL
DIASTOLIC BP, ECG02: NORMAL MMHG
EPI CELLS #/AREA URNS HPF: NORMAL /HPF
GLOBULIN SER CALC-MCNC: 3 G/DL (ref 2.3–3.5)
GLUCOSE SERPL-MCNC: 105 MG/DL (ref 65–100)
HCG UR QL: NEGATIVE
LIPASE SERPL-CCNC: 83 U/L (ref 73–393)
P-R INTERVAL, ECG05: 170 MS
POTASSIUM SERPL-SCNC: 3.9 MMOL/L (ref 3.5–5.1)
PROT SERPL-MCNC: 6.9 G/DL (ref 6.3–8.2)
Q-T INTERVAL, ECG07: 396 MS
QRS DURATION, ECG06: 88 MS
QTC CALCULATION (BEZET), ECG08: 448 MS
RBC #/AREA URNS HPF: NORMAL /HPF
SODIUM SERPL-SCNC: 142 MMOL/L (ref 136–145)
SYSTOLIC BP, ECG01: NORMAL MMHG
TROPONIN I SERPL-MCNC: <0.02 NG/ML (ref 0.02–0.05)
VENTRICULAR RATE, ECG03: 77 BPM
WBC URNS QL MICRO: NORMAL /HPF

## 2017-01-12 PROCEDURE — 74011250637 HC RX REV CODE- 250/637: Performed by: EMERGENCY MEDICINE

## 2017-01-12 PROCEDURE — 81025 URINE PREGNANCY TEST: CPT

## 2017-01-12 PROCEDURE — 74011000250 HC RX REV CODE- 250: Performed by: EMERGENCY MEDICINE

## 2017-01-12 PROCEDURE — 81015 MICROSCOPIC EXAM OF URINE: CPT | Performed by: EMERGENCY MEDICINE

## 2017-01-12 RX ORDER — PANTOPRAZOLE SODIUM 40 MG/1
40 TABLET, DELAYED RELEASE ORAL DAILY
Qty: 30 TAB | Refills: 2 | Status: SHIPPED | OUTPATIENT
Start: 2017-01-12

## 2017-01-12 RX ORDER — LIDOCAINE HYDROCHLORIDE 20 MG/ML
15 SOLUTION OROPHARYNGEAL
Status: COMPLETED | OUTPATIENT
Start: 2017-01-12 | End: 2017-01-12

## 2017-01-12 RX ORDER — SUCRALFATE 1 G/1
1 TABLET ORAL 4 TIMES DAILY
Qty: 60 TAB | Refills: 0 | Status: SHIPPED | OUTPATIENT
Start: 2017-01-12 | End: 2017-01-27

## 2017-01-12 RX ADMIN — LIDOCAINE HYDROCHLORIDE 15 ML: 20 SOLUTION ORAL; TOPICAL at 01:24

## 2017-01-12 RX ADMIN — Medication 30 ML: at 01:24

## 2017-01-12 NOTE — DISCHARGE INSTRUCTIONS
Gastroesophageal Reflux Disease (GERD): Care Instructions  Your Care Instructions    Gastroesophageal reflux disease (GERD) is the backward flow of stomach acid into the esophagus. The esophagus is the tube that leads from your throat to your stomach. A one-way valve prevents the stomach acid from moving up into this tube. When you have GERD, this valve does not close tightly enough. If you have mild GERD symptoms including heartburn, you may be able to control the problem with antacids or over-the-counter medicine. Changing your diet, losing weight, and making other lifestyle changes can also help reduce symptoms. Follow-up care is a key part of your treatment and safety. Be sure to make and go to all appointments, and call your doctor if you are having problems. Its also a good idea to know your test results and keep a list of the medicines you take. How can you care for yourself at home? · Take your medicines exactly as prescribed. Call your doctor if you think you are having a problem with your medicine. · Your doctor may recommend over-the-counter medicine. For mild or occasional indigestion, antacids, such as Tums, Gaviscon, Mylanta, or Maalox, may help. Your doctor also may recommend over-the-counter acid reducers, such as Pepcid AC, Tagamet HB, Zantac 75, or Prilosec. Read and follow all instructions on the label. If you use these medicines often, talk with your doctor. · Change your eating habits. ¨ Its best to eat several small meals instead of two or three large meals. ¨ After you eat, wait 2 to 3 hours before you lie down. ¨ Chocolate, mint, and alcohol can make GERD worse. ¨ Spicy foods, foods that have a lot of acid (like tomatoes and oranges), and coffee can make GERD symptoms worse in some people. If your symptoms are worse after you eat a certain food, you may want to stop eating that food to see if your symptoms get better. · Do not smoke or chew tobacco. Smoking can make GERD worse. If you need help quitting, talk to your doctor about stop-smoking programs and medicines. These can increase your chances of quitting for good. · If you have GERD symptoms at night, raise the head of your bed 6 to 8 inches by putting the frame on blocks or placing a foam wedge under the head of your mattress. (Adding extra pillows does not work.)  · Do not wear tight clothing around your middle. · Lose weight if you need to. Losing just 5 to 10 pounds can help. When should you call for help? Call your doctor now or seek immediate medical care if:  · You have new or different belly pain. · Your stools are black and tarlike or have streaks of blood. Watch closely for changes in your health, and be sure to contact your doctor if:  · Your symptoms have not improved after 2 days. · Food seems to catch in your throat or chest.  Where can you learn more? Go to http://alva-cecelia.info/. Enter W257 in the search box to learn more about \"Gastroesophageal Reflux Disease (GERD): Care Instructions. \"  Current as of: August 9, 2016  Content Version: 11.1  © 4590-3014 [employer]. Care instructions adapted under license by [employer] (which disclaims liability or warranty for this information). If you have questions about a medical condition or this instruction, always ask your healthcare professional. [address] any warranty or liability for your use of this information. Chest Pain: Care Instructions  Your Care Instructions  There are many things that can cause chest pain. Some are not serious and will get better on their own in a few days. But some kinds of chest pain need more testing and treatment. Your doctor may have recommended a follow-up visit in the next 8 to 12 hours. If you are not getting better, you may need more tests or treatment. Even though your doctor has released you, you still need to watch for any problems.  The doctor carefully checked you, but sometimes problems can develop later. If you have new symptoms or if your symptoms do not get better, get medical care right away. If you have worse or different chest pain or pressure that lasts more than 5 minutes or you passed out (lost consciousness), call 911 or seek other emergency help right away. A medical visit is only one step in your treatment. Even if you feel better, you still need to do what your doctor recommends, such as going to all suggested follow-up appointments and taking medicines exactly as directed. This will help you recover and help prevent future problems. How can you care for yourself at home? · Rest until you feel better. · Take your medicine exactly as prescribed. Call your doctor if you think you are having a problem with your medicine. · Do not drive after taking a prescription pain medicine. When should you call for help? Call 911 if:  · You passed out (lost consciousness). · You have severe difficulty breathing. · You have symptoms of a heart attack. These may include:  ¨ Chest pain or pressure, or a strange feeling in your chest.  ¨ Sweating. ¨ Shortness of breath. ¨ Nausea or vomiting. ¨ Pain, pressure, or a strange feeling in your back, neck, jaw, or upper belly or in one or both shoulders or arms. ¨ Lightheadedness or sudden weakness. ¨ A fast or irregular heartbeat. After you call 911, the  may tell you to chew 1 adult-strength or 2 to 4 low-dose aspirin. Wait for an ambulance. Do not try to drive yourself. Call your doctor today if:  · You have any trouble breathing. · Your chest pain gets worse. · You are dizzy or lightheaded, or you feel like you may faint. · You are not getting better as expected. · You are having new or different chest pain. Where can you learn more? Go to http://alva-cecelia.info/. Enter A120 in the search box to learn more about \"Chest Pain: Care Instructions. \"  Current as of: May 27, 2016  Content Version: 11.1  © 6281-3568 [employer]. Care instructions adapted under license by [facility] (which disclaims liability or warranty for this information). If you have questions about a medical condition or this instruction, always ask your healthcare professional. [address] any warranty or liability for your use of this information.

## 2017-01-12 NOTE — ED NOTES
\"I started hurting yesterday. It is like the worse heartburn I have ever had. I took medicine for it but then it really scared me when my back started hurting under my shoulder.   I have fibromyalgia too\"

## 2017-01-12 NOTE — ED NOTES
Patient discharged home ambulatory with family. Patient reports feeling better. Patient reports pain 4/10. Medication, follow up, and discharge discussed with patient. Patient verbalized understanding.

## 2017-01-12 NOTE — ED TRIAGE NOTES
C/o chest pain located under left breast, \"really bad heartburn\", upper back pain and nausea. Onset yesterday with worsening of symptoms today. Attempted heidy seltzer, motrin without relief.

## 2017-01-12 NOTE — ED PROVIDER NOTES
HPI Comments: Patient is a 45-year-old female who is coming in with a burning in her stomach that radiated up to her chest.  She states she does have a history of GERD and fibromyalgia. She states the symptoms of been there since yesterday have been bothering her a fair bit she did run out of her proton pump inhibitor a few days ago. She denies any cardiac symptoms or any shortness of breath. Patient is a 37 y.o. female presenting with chest pain. The history is provided by the patient. Chest Pain (Angina)    Pertinent negatives include no abdominal pain, no fever, no nausea, no palpitations, no shortness of breath and no vomiting. Past Medical History:   Diagnosis Date    Anxiety     Bipolar disorder ([facility] [address]     Depression     Endocrine disease      hypothyroid    Gastrointestinal disorder      esophageal ulcer    Hypothyroidism     Other ill-defined conditions(799.89)      hypothyroid, elevated cholesterol    Psychiatric disorder      dr Margarette Champagne DID, bipolar    Seizures ([facility]        History reviewed. No pertinent past surgical history. History reviewed. No pertinent family history. Social History     Social History    Marital status:      Spouse name: N/A    Number of children: N/A    Years of education: N/A     Occupational History    Not on file. Social History Main Topics    Smoking status: Current Every Day Smoker     Packs/day: 1.50    Smokeless tobacco: Never Used    Alcohol use No    Drug use: No    Sexual activity: Yes     Partners: Male     Birth control/ protection: Injection     Other Topics Concern    Not on file     Social History Narrative         ALLERGIES: Bupropion; Haldol [haloperidol lactate]; Zollie Coden; Paxil [paroxetine hcl]; Sulfa (sulfonamide antibiotics); Wellbutrin [bupropion hcl]; and Zoloft [sertraline]    Review of Systems   Constitutional: Negative for chills and fever.    Respiratory: Negative for chest tightness, shortness of breath, wheezing and stridor. Cardiovascular: Positive for chest pain. Negative for palpitations. Gastrointestinal: Negative for abdominal pain, diarrhea, nausea and vomiting. Skin: Negative. All other systems reviewed and are negative. Vitals:    01/11/17 2312 01/12/17 0039 01/12/17 0139   BP: (!) 190/98 (!) 208/80 (!) 194/91   Pulse: 83  80   Resp: 20  16   Temp: 98.2 °F (36.8 °C)     SpO2: 97% 98% 94%   Weight: 100.2 kg (221 lb)     Height: 5' 2\" (1.575 m)              Physical Exam   Constitutional: She is oriented to person, place, and time. She appears well-developed and well-nourished. No distress. HENT:   Head: Normocephalic and atraumatic. Eyes: Conjunctivae are normal. No scleral icterus. Neck: Normal range of motion. Neck supple. Cardiovascular: Normal rate, regular rhythm and normal heart sounds. Pulmonary/Chest: Effort normal and breath sounds normal. No stridor. No respiratory distress. She has no wheezes. She has no rales. She exhibits no tenderness. Abdominal: Soft. There is no tenderness. There is no rebound and no guarding. Neurological: She is alert and oriented to person, place, and time. No focal weakness   Skin: Skin is warm and dry. No rash noted. She is not diaphoretic. No erythema. Psychiatric: She has a normal mood and affect. Her behavior is normal.   Nursing note and vitals reviewed. MDM  Number of Diagnoses or Management Options  Chest pain, unspecified type:   Gastroesophageal reflux disease, esophagitis presence not specified:   Diagnosis management comments: Patient has negative blood work and normal chest x-ray she did get some relief from the GI cocktail I will treat patient symptomatically.        Amount and/or Complexity of Data Reviewed  Clinical lab tests: ordered and reviewed (Results for orders placed or performed during the hospital encounter of 01/12/17  -CBC WITH AUTOMATED DIFF       Result Value                         Ref Range                       WBC                                               9.5                           4.3 - 11.1 K/uL                 RBC                                               4.55                          4.05 - 5.25 M/uL                HGB                                               14.5                          11.7 - 15.4 g/dL                HCT                                               44.4                          35.8 - 46.3 %                   MCV                                               97.6                          79.6 - 97.8 FL                  MCH                                               31.9                          26.1 - 32.9 PG                  MCHC                                              32.7                          31.4 - 35.0 g/dL                RDW                                               13.5                          11.9 - 14.6 %                   PLATELET                                          327                           150 - 450 K/uL                  MPV                                               8.8 (L)                       10.8 - 14.1 FL                  DF                                                AUTOMATED                                                     NEUTROPHILS                                       68                            43 - 78 %                       LYMPHOCYTES                                       25                            13 - 44 %                       MONOCYTES                                         4                             4.0 - 12.0 %                    EOSINOPHILS                                       3                             0.5 - 7.8 %                     BASOPHILS                                         0                             0.0 - 2.0 %                     IMMATURE GRANULOCYTES                             0.4                           0.0 - 5.0 % ABS. NEUTROPHILS                                  6.4                           1.7 - 8.2 K/UL                  ABS. LYMPHOCYTES                                  2.4                           0.5 - 4.6 K/UL                  ABS. MONOCYTES                                    0.4                           0.1 - 1.3 K/UL                  ABS. EOSINOPHILS                                  0.3                           0.0 - 0.8 K/UL                  ABS. BASOPHILS                                    0.0                           0.0 - 0.2 K/UL                  ABS. IMM.  GRANS.                                  0.0                           0.0 - 0.5 K/UL             -METABOLIC PANEL, COMPREHENSIVE       Result                                            Value                         Ref Range                       Sodium                                            142                           136 - 145 mmol/L                Potassium                                         3.9                           3.5 - 5.1 mmol/L                Chloride                                          108 (H)                       98 - 107 mmol/L                 CO2                                               25                            21 - 32 mmol/L                  Anion gap                                         9                             7 - 16 mmol/L                   Glucose                                           105 (H)                       65 - 100 mg/dL                  BUN                                               8                             6 - 23 MG/DL                    Creatinine                                        0.50 (L)                      0.6 - 1.0 MG/DL                 GFR est AA                                        >60                           >60 ml/min/1.73m2               GFR est non-AA                                    >60                           >60 ml/min/1.73m2 Calcium                                           9.4                           8.3 - 10.4 MG/DL                Bilirubin, total                                  0.2                           0.2 - 1.1 MG/DL                 ALT                                               21                            12 - 65 U/L                     AST                                               11 (L)                        15 - 37 U/L                     Alk. phosphatase                                  70                            50 - 136 U/L                    Protein, total                                    6.9                           6.3 - 8.2 g/dL                  Albumin                                           3.9                           3.5 - 5.0 g/dL                  Globulin                                          3.0                           2.3 - 3.5 g/dL                  A-G Ratio                                         1.3                           1.2 - 3.5                  -TROPONIN I       Result                                            Value                         Ref Range                       Troponin-I, Qt.                                   <0.02 (L)                     0.02 - 0.05 NG/ML          -URINE MICROSCOPIC       Result                                            Value                         Ref Range                       WBC                                               0-3                           0 /hpf                          RBC                                               10-20                         0 /hpf                          Epithelial cells                                  3-5                           0 /hpf                          Bacteria                                          0                             0 /hpf                          Casts                                             3-5                           0 /lpf                     -LIPASE Result                                            Value                         Ref Range                       Lipase                                            83                            73 - 393 U/L               -HCG URINE, QL. - POC       Result                                            Value                         Ref Range                       Pregnancy test,urine (POC)                        NEGATIVE                      NEG                       )  Tests in the radiology section of CPT®: ordered and reviewed (Xr Chest Pa Lat    Result Date: 1/12/2017  Exam: 2 views of the chest Indication: Acute chest pain Comparison: Chest radiograph from February 18, 2016 Findings: The cardiomediastinal silhouette is within normal limits. The lungs are symmetrically inflated and clear. Pleural spaces are unremarkable. No evidence of pneumothorax. No acute osseous abnormality. IMPRESSION: No acute cardiopulmonary findings.    )      ED Course       Procedures

## 2017-01-16 ENCOUNTER — HOSPITAL ENCOUNTER (OUTPATIENT)
Dept: PHYSICAL THERAPY | Age: 42
Discharge: HOME | End: 2017-01-16
Payer: MEDICARE

## 2017-01-16 NOTE — PROGRESS NOTES
Therapy Center at [address]  [phone]   [phone]     OUTPATIENT DAILY NOTE    NAME: Alva Drew    DATE: 1/16/2017    Patient canceled physical therapy appointment today due to feeling sick. Will plan to follow up on next scheduled visit.     REGI LeonardT

## 2017-01-17 ENCOUNTER — HOSPITAL ENCOUNTER (OUTPATIENT)
Dept: PHYSICAL THERAPY | Age: 42
Discharge: HOME | End: 2017-01-17
Payer: MEDICARE

## 2017-01-23 ENCOUNTER — HOSPITAL ENCOUNTER (OUTPATIENT)
Dept: PHYSICAL THERAPY | Age: 42
Discharge: HOME | End: 2017-01-23
Payer: MEDICARE

## 2017-01-23 PROCEDURE — 97110 THERAPEUTIC EXERCISES: CPT

## 2017-01-23 NOTE — PROGRESS NOTES
Alva Drew   (:1975) Therapy Center at  [address]  [phone]   [phone]       Outpatient PHYSICAL THERAPY: Daily Note  Fall Risk Score: 2 (? 5 = High Risk)  ICD-10: Treatment Diagnosis: Fibromyalgia (M79.7)                 Cervicalgia (M54.2)           Low back pain (M54.5)           REFERRING PHYSICIAN: Crystal Morris, *  MD Orders: Evaluate and treat   Return Physician Appointment: none specified  MEDICAL/REFERRING DIAGNOSIS: Fibromyalgia [M79.7]  DATE OF ONSET: chronic problem  PRIOR LEVEL OF FUNCTION: independent  PRECAUTIONS/ALLERGIES: buproprion, haldol, latuda, paxil, sulfa, wellbutrin, zoloft  ASSESSMENT:  ?????? ? ? This section established at most recent assessment??????????  Alva Drew has now attended 9 physical therapy sessions for chronic pain associated with a diagnosis of fibromyalgia. This session, she demonstrated improved activity tolerance and improved functional mobility. Despite the above listed improvements, she continues to demonstrate decreased B UE/LE strength/endurance, pain with end range mobility at extremities, decreased functional mobility, and decreased activity tolerance. Pt may continue to benefit from skilled PT to address the above listed deficits to improve ability to perform pain-free ADLs/IADLs and to improve overall quality of life prior to discharge. PROBLEM LIST (Impairments causing functional limitations):  1. Decreased Strength affecting function  2. Edema affecting function  3. Decreased ADL/Functional Activities  4. Decreased Flexibility/joint mobility  5. Decreased transfer abilities  6. Increased Pain affecting function  7. Decreased Activity tolerance   8. Decreased Pacing skills  9. Decreased Work Simplification/Energy Conservation techniques  10.  Increased Fatigue affecting function  GOALS: (Goals have been discussed and agreed upon with patient.)  SHORT-TERM FUNCTIONAL GOALS: Time Frame: 4 weeks  1. Patient will be compliant with HEP. (MET 1/9/2017)  2. Patient will have a decrease in average 24 hour pain level to 4/10 in order to improve functional mobility. (PROGRESSING 1/9/2017)  3. Patient will have a decrease on the FIQ to 79 in order to improve their quality of life. (PROGRESSING 1/9/2017)  4. Patient will have an increase on the 6 minute walk to 1,200 feet in order to indicate improve endurance. (MET 1/9/2017)  5. Pataient will verbalize 3 ways to improve body mechanics in order to reduce pain. (PROGRESSING 1/9/2017)  DISCHARGE GOALS: Time Frame: 8 weeks  1. Patient will be independent with HEP. 2. Patient will have a decrease in average 24 hour pain level to 3/10 in order to improve functional mobility. 3. Patient will have a decrease on the FIQ to 74 in order to improve their quality of life. 4. Patient will have a decrease on the fatigue scale to 67 in order to complete ADLs and IADLs. 5. Patient will have an increase on the 6 minute walk to 1,300 feet in order to demonstrate improved endurance. REHABILITATION POTENTIAL FOR STATED GOALS: [facility] OF CARE:  INTERVENTIONS PLANNED: (Benefits and precautions of physical therapy have been discussed with the patient.)  1. balance exercise  2. bed mobility  3. cold  4. family education  5. gait training  6. heat  7. home exercise program (HEP)  8. manual therapy  9. neuromuscular re-education/strengthening  10. range of motion: active/assisted/passive  11. therapeutic activities  12. therapeutic exercise/strengthening  13. transcutaneous electrical nerve stimulation (TENs)  14. transfer training  15. ultrasound  16. Aquatic therapy  TREATMENT PLAN EFFECTIVE DATES: 12/6/2016 TO 3/6/2016  FREQUENCY/DURATION: Follow patient 2 times a week for 8 weeks to address above goals. Regarding Alva Drew's therapy, I certify that the treatment plan above will be carried out by a therapist or under their direction.   Thank you for this referral,  Jolene Gaytan, DPT     Referring Physician Signature: Anna Silverman, *          Date           SUBJECTIVE:  History of Present Injury/Illness (Reason for Referral): Pt states she started having chronic pain after she got thrown off of horse when she was in her late 25s. She was diagnosed with fibromyalgia 2 years ago. Pt states she has flare-ups of fibromyalgia when it gets cold (hands, hips, arms, scapular regions), but her lower back hurts all the time. Entire back hurts all the time. States she sometimes gets shooting pains down her anterior legs that start at her hips. Pt states fibromyalgia symptoms significantly worsened 4 months ago when her dad  and she was admitted to [facility]. Pain at worst is 9/10 (aggravating activities include standing/walking for prolonged periods of time, sitting down on surfaces that are too hard/soft, going up/down stairs). Pain at best is almost 0/10 (eases pain with heat, OTC and prescribed pain medications). Present Symptoms: 5/10 pain in midback region today; states she had to go to ER for chest pains and stomach problems last week; she had increased stress last week (somebody moved into her house with her, and she had to ask them to move out because it was too overwhelming); she is going to the GI doctor for the stomach problems     Dominant Side: right  Past Medical History: anxiety, bipolar disorder, depression, endocrine disease (hypothyroid), gastrointestinal disorder (esophageal ulcer), hypothyroidism, psychiatric disorder (bipolar), seizures    Current Medications: updated list in paper chart  Current Outpatient Prescriptions on File Prior to Encounter   Medication Sig Dispense Refill    ciprofloxacin HCl (CIPRO) 500 mg tablet Take 1 Tab by mouth two (2) times a day. 20 Tab 0    pantoprazole (PROTONIX) 40 mg tablet Take 40 mg by mouth daily.  QUEtiapine (SEROQUEL) 100 mg tablet Take 150 mg by mouth nightly.       folic acid (FOLVITE) 1 mg tablet Take  by mouth daily.  risperiDONE (RISPERDAL) 2 mg tablet Take 2 mg by mouth nightly.  clonazePAM (KLONOPIN) 0.5 mg tablet Take 0.5 mg by mouth three (3) times daily.  gabapentin (NEURONTIN) 300 mg capsule Take 300 mg by mouth three (3) times daily as needed.  carBAMazepine XR (TEGRETOL XR) 200 mg SR tablet Take 200 mg by mouth two (2) times a day. 200 mg in am; 400 mg at night      meloxicam (MOBIC) 15 mg tablet Take 15 mg by mouth daily.  lamotrigine (LAMICTAL) 100 mg tablet Take 200 mg by mouth daily.  PRAZOSIN 4 mg by Does Not Apply route nightly. Does not know the dose takes 3 pills a day      lithium carbonate (LITHOBID) 150 mg capsule Take 600 mg by mouth two (2) times daily (with meals).  SYNTHROID 75 mcg Tab take 75 mcg by mouth daily. No current facility-administered medications on file prior to encounter. Date Last Reviewed: 1/23/2017   Social History/Home Situation: 15 steps unilateral railing to get into apartment   Work/Activity History: on disability (in 2000)  OBJECTIVE:  Reassessment on 1/9/2017  Tool Used: Fibromyalgia Impact Questionnaire  Score:  Initial: 84.84 Most Recent: 81.26 (Date: 1/9/2017)   Interpretation of Score: <40 = Mild Fibromyalgia Syndrome   40-60 = Moderate Fibromyalgia Syndrome   >60-70 = Severe Fibromyalgia Syndrome  Score 0 1-18 19-37 38-57 58-77 78-96 97   Modifier CH CI CJ CK CL CM CN     ? Mobility - Walking and Moving Around:     - CURRENT STATUS: CM - 80%-99% impaired, limited or restricted    - GOAL STATUS:  CL - 60%-79% impaired, limited or restricted    - D/C STATUS:  ---------------To be determined---------------  [place] / [place] / Product Type: Medicare /      Outcome Measure: Tool Used: Modified Fatigue Impact Scale  Score:  Initial: 77 Most Recent: 64 (1/9/2017)   Interpretation of Score:  The higher the score equates to a higher level of perceived fatigue. Outcome Measure: Tool Used: Randle Depression Index  Score:  Initial: 28 Most Recent: 28 (1/9/2017)   Interpretation of Score:  0-9 = No Depression   10-19 = Mild Depression   19-29 = Moderate Depression   >30 = Severe Depression    Observation/Orthostatic Postural Assessment: kyphotic posture, forward head, rounded shoulders  Palpation: significant pain with slight pressure along cervical and lower thoracic and lumbar paraspinals and vertebrae  ROM: WFL but painful at end range         Strength:   Shoulder flexion 4-/5 (pulling pain in center of neck)  Shoulder abduction 4+/5 (pulling pain in center of neck)  Elbow flexion 5/5 (pulling pain in center of neck)  Elbow extension 5/5 (pulling pain in center of neck) Hip flexion 4+/5 (pain in neck due to pt bracing)  Hip abduction NT  Hip extension NT  Knee extension 5/5 (slight pain in anterior R thigh)  Knee flexion NT  Dorsiflexion 4+/5  Plantarflexion 4+/5         Neurological Screen:   Myotomes: WFL  Dermatomes: pt reporting occasional tingling in B hands    Functional Mobility: Patient ambulated 1,290 feet during 6 minute walk test with 0 rest breaks - assessed on 1/9/2017. Balance: good in sitting; good in standing  TREATMENT:   (In addition to Assessment/Re-Assessment sessions the following treatments were rendered)  Therapeutic Exercise: ( 40 minutes):  Exercises per grid below to improve mobility, strength, balance and dynamic movement of arm - bilateral and leg - bilateral to improve functional mobility. Required minimal visual, verbal and manual cues to promote proper body alignment, promote proper body posture and promote proper body mechanics. Progressed resistance, range, repetitions and complexity of movement as indicated.     Date:  1/2/2017 Date:  1/9/2017 Date:  1/23/2017   Activity/Exercise      Nustep L2 resistance with B LEs and UEs for 10 minutes to improve blood flow and mobility L3 resistance with B LEs and UEs for 13 minutes to improve blood flow and mobility L3 resistance with B LEs and UEs for 13 minutes to improve blood flow and mobility   LTR in hooklying 20 reps/1 set each direction with 2-3 second hold 20 reps/1 set each direction with 2-3 second hold 20 reps/1 set each direction with 2-3 second hold   Hamstring stretch in supine 30 second hold x 3 reps each direction; passive stretch by therapist 30 second hold x 3 reps each direction; passive stretch by therapist 30 second hold x 3 reps each direction; passive stretch by therapist   Piriformis stretch in supine 30 second hold x 3 reps each direction; passive stretch by therapist  30 second hold x 3 reps each direction; passive stretch by therapist   Posterior pelvic tilt in hooklying      Transversus abdominus (TA) activation      Bridging in hooklying   20 reps/1 set with cues to increase hip extension AROM   Postural exercises in sitting      Abdominal breathing in hooklying 10 reps with cues to breathe in through nose and out through mouth with cues to let belly rise/fall accordingly     Cervical forward flexion stretch  30 second hold x 5 reps each side    Cervical lateral flexion stretch  30 second hold x 3 reps each side    Ambulation over level ground  6 minutes per assessment above    Postural exercises in sitting  Cues for more upright posture with less shoulder elevation in sitting Cues for more upright posture with more anterior pelvic tilt in sitting   Scapular retraction in sitting   5 reps/1 set with 2-3 second hold; cues to avoid shoulder elevation   Standing shoulder rows   10 reps/1 set with cues for increased scapular retraction; cues for correct technique   Standing shoulder extension   10 reps/1 set with cues for maintaining elbow extension throughout     HEP: As above; handouts given to patient for all exercises.   Patient/Family Education:     [x] Benefits of Exercise    [x] Pain Management    [x] Nutrition    [x] Improving sleep    [x] Body Mechanics    [] Fatigue    [] Grief/Depression/Stress   Therapeutic Modalities:   ____________________________________________________________________________________________________________  Treatment Assessment:  Pt demonstrating better posture with less shoulder elevation and less cues this session. Pain at end of treatment: pt reporting slight increase in pain at end of treatment    Progression/Medical Necessity:   · Patient is expected to demonstrate progress in strength, range of motion, balance and functional technique to improve ability to perform pain-free ADLs/IADLs and to improve overall quality of life. · Patient demonstrates good rehab potential due to higher previous functional level. Compliance with Program/Exercises: Good compliance with attending scheduled sessions and performing HEP. Reason for Continuation of Services/Other Comments:  · Patient continues to require modification of therapeutic interventions to increase complexity of exercises. Recommendations/Intent for next treatment session: \"Treatment next visit will focus on advancements to more challenging activities and reduction in assistance provided\".   FIBRO PROGRAM  Total Treatment Duration:  PT Patient Time In/Time Out  Time In: 1300  Time Out: [address], DPT

## 2017-01-24 ENCOUNTER — HOSPITAL ENCOUNTER (OUTPATIENT)
Dept: PHYSICAL THERAPY | Age: 42
Discharge: HOME | End: 2017-01-24
Payer: MEDICARE

## 2017-01-24 PROCEDURE — 97113 AQUATIC THERAPY/EXERCISES: CPT

## 2017-01-24 NOTE — PROGRESS NOTES
Alva Drew   (:1975) [facility] at  [address]  [phone]   [phone]       Outpatient PHYSICAL THERAPY: Daily Note and Aquatic Note  Fall Risk Score: 2 (? 5 = High Risk)  ICD-10: Treatment Diagnosis: Fibromyalgia (M79.7)                 Cervicalgia (M54.2)           Low back pain (M54.5)           REFERRING PHYSICIAN: Susannah Lentz MD Orders: Evaluate and treat   Return Physician Appointment: none specified  MEDICAL/REFERRING DIAGNOSIS: Fibromyalgia [M79.7]  DATE OF ONSET: chronic problem  PRIOR LEVEL OF FUNCTION: independent  PRECAUTIONS/ALLERGIES: buproprion, haldol, latuda, paxil, sulfa, wellbutrin, zoloft  ASSESSMENT:  ?????? ? ? This section established at most recent assessment??????????  Alva Drew has now attended 9 physical therapy sessions for chronic pain associated with a diagnosis of fibromyalgia. This session, she demonstrated improved activity tolerance and improved functional mobility. Despite the above listed improvements, she continues to demonstrate decreased B UE/LE strength/endurance, pain with end range mobility at extremities, decreased functional mobility, and decreased activity tolerance. Pt may continue to benefit from skilled PT to address the above listed deficits to improve ability to perform pain-free ADLs/IADLs and to improve overall quality of life prior to discharge. PROBLEM LIST (Impairments causing functional limitations):  1. Decreased Strength affecting function  2. Edema affecting function  3. Decreased ADL/Functional Activities  4. Decreased Flexibility/joint mobility  5. Decreased transfer abilities  6. Increased Pain affecting function  7. Decreased Activity tolerance   8. Decreased Pacing skills  9. Decreased Work Simplification/Energy Conservation techniques  10.  Increased Fatigue affecting function  GOALS: (Goals have been discussed and agreed upon with patient.)  SHORT-TERM FUNCTIONAL GOALS: Time Frame: 4 weeks  1. Patient will be compliant with HEP. (MET 1/9/2017)  2. Patient will have a decrease in average 24 hour pain level to 4/10 in order to improve functional mobility. (PROGRESSING 1/9/2017)  3. Patient will have a decrease on the FIQ to 79 in order to improve their quality of life. (PROGRESSING 1/9/2017)  4. Patient will have an increase on the 6 minute walk to 1,200 feet in order to indicate improve endurance. (MET 1/9/2017)  5. Pataient will verbalize 3 ways to improve body mechanics in order to reduce pain. (PROGRESSING 1/9/2017)  DISCHARGE GOALS: Time Frame: 8 weeks  1. Patient will be independent with HEP. 2. Patient will have a decrease in average 24 hour pain level to 3/10 in order to improve functional mobility. 3. Patient will have a decrease on the FIQ to 74 in order to improve their quality of life. 4. Patient will have a decrease on the fatigue scale to 67 in order to complete ADLs and IADLs. 5. Patient will have an increase on the 6 minute walk to 1,300 feet in order to demonstrate improved endurance. REHABILITATION POTENTIAL FOR STATED GOALS: Genice Los OF CARE:  INTERVENTIONS PLANNED: (Benefits and precautions of physical therapy have been discussed with the patient.)  1. balance exercise  2. bed mobility  3. cold  4. family education  5. gait training  6. heat  7. home exercise program (HEP)  8. manual therapy  9. neuromuscular re-education/strengthening  10. range of motion: active/assisted/passive  11. therapeutic activities  12. therapeutic exercise/strengthening  13. transcutaneous electrical nerve stimulation (TENs)  14. transfer training  15. ultrasound  16. Aquatic therapy  TREATMENT PLAN EFFECTIVE DATES: 12/6/2016 TO 3/6/2016  FREQUENCY/DURATION: Follow patient 2 times a week for 8 weeks to address above goals.   Regarding Alva Drew's therapy, I certify that the treatment plan above will be carried out by a therapist or under their direction. Thank you for this referral,  Betty Seen, PTA     Referring Physician Signature: Amrita Avalos, *          Date           SUBJECTIVE:  History of Present Injury/Illness (Reason for Referral): Pt states she started having chronic pain after she got thrown off of horse when she was in her late 25s. She was diagnosed with fibromyalgia 2 years ago. Pt states she has flare-ups of fibromyalgia when it gets cold (hands, hips, arms, scapular regions), but her lower back hurts all the time. Entire back hurts all the time. States she sometimes gets shooting pains down her anterior legs that start at her hips. Pt states fibromyalgia symptoms significantly worsened 4 months ago when her dad  and she was admitted to [facility]. Pain at worst is 9/10 (aggravating activities include standing/walking for prolonged periods of time, sitting down on surfaces that are too hard/soft, going up/down stairs). Pain at best is almost 0/10 (eases pain with heat, OTC and prescribed pain medications). Present Symptoms: 5/10 pain today     Dominant Side: right  Past Medical History: anxiety, bipolar disorder, depression, endocrine disease (hypothyroid), gastrointestinal disorder (esophageal ulcer), hypothyroidism, psychiatric disorder (bipolar), seizures    Current Medications: updated list in paper chart  Current Outpatient Prescriptions on File Prior to Encounter   Medication Sig Dispense Refill    ciprofloxacin HCl (CIPRO) 500 mg tablet Take 1 Tab by mouth two (2) times a day. 20 Tab 0    pantoprazole (PROTONIX) 40 mg tablet Take 40 mg by mouth daily.  QUEtiapine (SEROQUEL) 100 mg tablet Take 150 mg by mouth nightly.  folic acid (FOLVITE) 1 mg tablet Take  by mouth daily.  risperiDONE (RISPERDAL) 2 mg tablet Take 2 mg by mouth nightly.  clonazePAM (KLONOPIN) 0.5 mg tablet Take 0.5 mg by mouth three (3) times daily.       gabapentin (NEURONTIN) 300 mg capsule Take 300 mg by mouth three (3) times daily as needed.  carBAMazepine XR (TEGRETOL XR) 200 mg SR tablet Take 200 mg by mouth two (2) times a day. 200 mg in am; 400 mg at night      meloxicam (MOBIC) 15 mg tablet Take 15 mg by mouth daily.  lamotrigine (LAMICTAL) 100 mg tablet Take 200 mg by mouth daily.  PRAZOSIN 4 mg by Does Not Apply route nightly. Does not know the dose takes 3 pills a day      lithium carbonate (LITHOBID) 150 mg capsule Take 600 mg by mouth two (2) times daily (with meals).  SYNTHROID 75 mcg Tab take 75 mcg by mouth daily. No current facility-administered medications on file prior to encounter. Date Last Reviewed: 1/24/2017   Social History/Home Situation: 15 steps unilateral railing to get into apartment   Work/Activity History: on disability (in 2000)  OBJECTIVE:  Reassessment on 1/9/2017  Tool Used: Fibromyalgia Impact Questionnaire  Score:  Initial: 84.84 Most Recent: 81.26 (Date: 1/9/2017)   Interpretation of Score: <40 = Mild Fibromyalgia Syndrome   40-60 = Moderate Fibromyalgia Syndrome   >60-70 = Severe Fibromyalgia Syndrome  Score 0 1-18 19-37 38-57 58-77 78-96 97   Modifier CH CI CJ CK CL CM CN     ? Mobility - Walking and Moving Around:     - CURRENT STATUS: CM - 80%-99% impaired, limited or restricted    - GOAL STATUS:  CL - 60%-79% impaired, limited or restricted    - D/C STATUS:  ---------------To be determined---------------  [place] / [place] / Product Type: Medicare /      Outcome Measure: Tool Used: Modified Fatigue Impact Scale  Score:  Initial: 77 Most Recent: 64 (1/9/2017)   Interpretation of Score: The higher the score equates to a higher level of perceived fatigue. Outcome Measure:    Tool Used: Beck Depression Index  Score:  Initial: 28 Most Recent: 28 (1/9/2017)   Interpretation of Score:  0-9 = No Depression   10-19 = Mild Depression   19-29 = Moderate Depression   >30 = Severe Depression    Observation/Orthostatic Postural Assessment: kyphotic posture, forward head, rounded shoulders  Palpation: significant pain with slight pressure along cervical and lower thoracic and lumbar paraspinals and vertebrae  ROM: WFL but painful at end range         Strength:   Shoulder flexion 4-/5 (pulling pain in center of neck)  Shoulder abduction 4+/5 (pulling pain in center of neck)  Elbow flexion 5/5 (pulling pain in center of neck)  Elbow extension 5/5 (pulling pain in center of neck) Hip flexion 4+/5 (pain in neck due to pt bracing)  Hip abduction NT  Hip extension NT  Knee extension 5/5 (slight pain in anterior R thigh)  Knee flexion NT  Dorsiflexion 4+/5  Plantarflexion 4+/5         Neurological Screen:   Myotomes: WFL  Dermatomes: pt reporting occasional tingling in B hands    Functional Mobility: Patient ambulated 1,290 feet during 6 minute walk test with 0 rest breaks - assessed on 1/9/2017. Balance: good in sitting; good in standing  TREATMENT:     Aquatic Therapy (45 minutes): Aquatic treatment performed per flow grid for Decreased muscle strength and Decreased endurance.  Cues provided for alignment and to breathe.      Aquatic Exercise Log       Date  12-13-16 Date  12-20-16 Date  12-27-16 Date  1-3-17 Date  1-10-17 1-24-17   Activity/ Exercise Parameters Parameters Parameters Parameters Parameters    Walking forward 2 laps with HHA 2 laps  2 laps 2 laps  2 laps 2 laps   Walking backward 2 laps with HHA 2 laps  2 laps 2 laps  2 laps 2 laps   Walking sideways 2 laps with HHA 2 laps  2 laps 2 laps  2 laps 2 laps      Marching 2 laps with HHA 2 laps 2 laps 2 laps  2 laps 2 laps      Goose Step 2 laps with HHA 2 laps 2 laps 2 laps  2 laps 2 laps      Tip toes     2 laps 2 laps  2 laps 2 laps      Heels                 Lunges              Side step squats              LE Exercises noodle noodle noodle noodle  noodle noodle      Hip Flex/Ext Marching x 10 B Marching x 10 B Marching x 12 B Marching x 15 B  Marching x 20 B Marching x 20 B      Hip Abd/Add X 10 B X 10 B X 12 B X 15 B  x 20 B X 20 B      Hip IR/ER                 Calf raises                 Knee Flex                 Squats                 Leg Circles                 Step Ups              UE Exercises noodle noodle noodle noodle  noodle noodle      Squeeze In X 10 B X 10 B X 12 B X 15 B  x 20 B X 20 B      Push Down X 10 B X 10 B X 12 B X 15 B  x 20 B X 20 B      Pull Down X 10 B X 10 B   X 15 B  x 20 B       Bicep/Tricep       X 10 B  x 20 B    Rows/Press outs   Push ups x 10 B Push ups x 12 B Push ups   X 15   Resisted press outs and rows x 15  push ups x 20  Push ups x 20     Chi Positions                 Trunk Rotation       With noodle x 10    Jump rope x 15    Deep H2O/ Noodles 4 to 5 feet with noodles 4-5 feet with noodles 4-5 feet shallow  shallow  4-5 feet      Stabilization Core stability               Arms only     Straddle - 2 laps Straddle - 2 laps  straddle  - 2 laps Straddle - 2 laps      Legs only Under arms- bicycle x 2 minutes Straddle - 4 laps with arms and legs  Under arms- bicycle Straddle - 2 laps     Both - 2 laps Straddle - 2 laps     Both - 2 laps  straddle - 2 laps    Both - 2 laps Straddle - 2 laps    Both - 2 laps   Cross   [place] 2 x 20  2 x 20  X 20      X 20       Scissors 2 x 20  2 x 20  X 20      X 20       Crab walk   2 laps 2 laps 2 laps  Clams x 15 B  Reverse clams x 15 B  2 laps   Clams x 20 B  Reverse clams x 20 B 2 laps   Clams x 20   Reverse clams x 20    Lower abdominal   work  X 10  2 sequences x 10 each way 2 sequences x 10 each way           Cardio                 Jogging   While punching noodles down by sides          Lap   Swimming                 Stretches [] In Pool  [x]  Whirlpool* [x] In Pool  [] Whirlpool* [] In Pool  [x] Whirlpool* [] In Pool  [x]  Whirlpool* [] In Pool  [x] Whirlpool* whirlpool      Hamstrings X 3 B X 3 B X 3 B X 3 B X 3 B X 3 B      Heelcords X 3 B X 3 B X 3 B X 3 B  x 3 B X 3 B      Piriformis X 3 B X 3 B X 3 B X 3 B  x 3 B X 3 B      Neck                                * For increased soft tissue extensibility a warm water (over 100°F) environment was utilized. Supervision/monitoring was provided at all times. ____________________________________________________________________________________________________________  Treatment Assessment:  Patient reports she felt better after the treatment today. No other complaints. Continue per Plan of care. Pain at end of treatment: 1/10    Progression/Medical Necessity:   · Patient is expected to demonstrate progress in strength, range of motion, balance and functional technique to improve ability to perform pain-free ADLs/IADLs and to improve overall quality of life. · Patient demonstrates good rehab potential due to higher previous functional level. Compliance with Program/Exercises: Good compliance with attending scheduled sessions and performing HEP. Reason for Continuation of Services/Other Comments:  · Patient continues to require modification of therapeutic interventions to increase complexity of exercises. Recommendations/Intent for next treatment session: \"Treatment next visit will focus on advancements to more challenging activities and reduction in assistance provided\".   FIBRO PROGRAM  Total Treatment Duration: 45 minutes   PT Patient Time In/Time Out  Time In: 1250  Time Out: 482 Juan Pablo Mcfarland PTA  1/24/2017

## 2017-01-30 ENCOUNTER — HOSPITAL ENCOUNTER (OUTPATIENT)
Dept: PHYSICAL THERAPY | Age: 42
Discharge: HOME | End: 2017-01-30
Payer: MEDICARE

## 2017-01-30 PROCEDURE — 97110 THERAPEUTIC EXERCISES: CPT

## 2017-01-30 NOTE — PROGRESS NOTES
Shoulder Extension    Instructions: Holding elastic band with both hands with both arms in front of you with your elbows straight, pull the band downwards and back towards your sides. Avoid elevating your shoulders during the motion. Repeat: 20 times Complete: 1 sets Perform: 1 times/day     Shoulder Rows    Instructions: Holding elastic band with both hands (palms facing each other), draw back the band as you bend your elbows and squeeze your shoulder blades together. Keep your elbows near the side of your body. Return to starting position and repeat.     Repeat: 20 times Complete: 1 sets Perform: 1 times/day

## 2017-01-30 NOTE — PROGRESS NOTES
Alva Drew   (:1975) [facility] at  [address]  [phone]   [phone]       Outpatient PHYSICAL THERAPY: Daily Note  Fall Risk Score: 2 (? 5 = High Risk)  ICD-10: Treatment Diagnosis: Fibromyalgia (M79.7)                 Cervicalgia (M54.2)           Low back pain (M54.5)           REFERRING PHYSICIAN: Anna Silverman, *  MD Orders: Evaluate and treat   Return Physician Appointment: none specified  MEDICAL/REFERRING DIAGNOSIS: Fibromyalgia [M79.7]  DATE OF ONSET: chronic problem  PRIOR LEVEL OF FUNCTION: independent  PRECAUTIONS/ALLERGIES: buproprion, haldol, latuda, paxil, sulfa, wellbutrin, zoloft  ASSESSMENT:  ?????? ? ? This section established at most recent assessment??????????  Alva Drew has now attended 9 physical therapy sessions for chronic pain associated with a diagnosis of fibromyalgia. This session, she demonstrated improved activity tolerance and improved functional mobility. Despite the above listed improvements, she continues to demonstrate decreased B UE/LE strength/endurance, pain with end range mobility at extremities, decreased functional mobility, and decreased activity tolerance. Pt may continue to benefit from skilled PT to address the above listed deficits to improve ability to perform pain-free ADLs/IADLs and to improve overall quality of life prior to discharge. PROBLEM LIST (Impairments causing functional limitations):  1. Decreased Strength affecting function  2. Edema affecting function  3. Decreased ADL/Functional Activities  4. Decreased Flexibility/joint mobility  5. Decreased transfer abilities  6. Increased Pain affecting function  7. Decreased Activity tolerance   8. Decreased Pacing skills  9. Decreased Work Simplification/Energy Conservation techniques  10.  Increased Fatigue affecting function  GOALS: (Goals have been discussed and agreed upon with patient.)  SHORT-TERM FUNCTIONAL GOALS: Time Frame: 4 weeks  1. Patient will be compliant with HEP. (MET 1/9/2017)  2. Patient will have a decrease in average 24 hour pain level to 4/10 in order to improve functional mobility. (PROGRESSING 1/9/2017)  3. Patient will have a decrease on the FIQ to 79 in order to improve their quality of life. (PROGRESSING 1/9/2017)  4. Patient will have an increase on the 6 minute walk to 1,200 feet in order to indicate improve endurance. (MET 1/9/2017)  5. Pataient will verbalize 3 ways to improve body mechanics in order to reduce pain. (PROGRESSING 1/9/2017)  DISCHARGE GOALS: Time Frame: 8 weeks  1. Patient will be independent with HEP. 2. Patient will have a decrease in average 24 hour pain level to 3/10 in order to improve functional mobility. 3. Patient will have a decrease on the FIQ to 74 in order to improve their quality of life. 4. Patient will have a decrease on the fatigue scale to 67 in order to complete ADLs and IADLs. 5. Patient will have an increase on the 6 minute walk to 1,300 feet in order to demonstrate improved endurance. REHABILITATION POTENTIAL FOR STATED GOALS: Jose Bame OF CARE:  INTERVENTIONS PLANNED: (Benefits and precautions of physical therapy have been discussed with the patient.)  1. balance exercise  2. bed mobility  3. cold  4. family education  5. gait training  6. heat  7. home exercise program (HEP)  8. manual therapy  9. neuromuscular re-education/strengthening  10. range of motion: active/assisted/passive  11. therapeutic activities  12. therapeutic exercise/strengthening  13. transcutaneous electrical nerve stimulation (TENs)  14. transfer training  15. ultrasound  16. Aquatic therapy  TREATMENT PLAN EFFECTIVE DATES: 12/6/2016 TO 3/6/2016  FREQUENCY/DURATION: Follow patient 2 times a week for 8 weeks to address above goals. Regarding Alva Drew's therapy, I certify that the treatment plan above will be carried out by a therapist or under their direction.   Thank you for this referral,  Ibis Funez, DPT     Referring Physician Signature: Narinder Null, *          Date           SUBJECTIVE:  History of Present Injury/Illness (Reason for Referral): Pt states she started having chronic pain after she got thrown off of horse when she was in her late 25s. She was diagnosed with fibromyalgia 2 years ago. Pt states she has flare-ups of fibromyalgia when it gets cold (hands, hips, arms, scapular regions), but her lower back hurts all the time. Entire back hurts all the time. States she sometimes gets shooting pains down her anterior legs that start at her hips. Pt states fibromyalgia symptoms significantly worsened 4 months ago when her dad  and she was admitted to [facility]. Pain at worst is 9/10 (aggravating activities include standing/walking for prolonged periods of time, sitting down on surfaces that are too hard/soft, going up/down stairs). Pain at best is almost 0/10 (eases pain with heat, OTC and prescribed pain medications). Present Symptoms: states her stress level has been better (but has not gone away); states pain is 4/10, mainly in hips (back feels better); pt is back on ultram (tenporary until she can get back to pain management)     Dominant Side: right  Past Medical History: anxiety, bipolar disorder, depression, endocrine disease (hypothyroid), gastrointestinal disorder (esophageal ulcer), hypothyroidism, psychiatric disorder (bipolar), seizures    Current Medications: updated list in paper chart;   Current Outpatient Prescriptions on File Prior to Encounter   Medication Sig Dispense Refill    ciprofloxacin HCl (CIPRO) 500 mg tablet Take 1 Tab by mouth two (2) times a day. 20 Tab 0    pantoprazole (PROTONIX) 40 mg tablet Take 40 mg by mouth daily.  QUEtiapine (SEROQUEL) 100 mg tablet Take 150 mg by mouth nightly.  folic acid (FOLVITE) 1 mg tablet Take  by mouth daily.       risperiDONE (RISPERDAL) 2 mg tablet Take 2 mg by mouth nightly.  clonazePAM (KLONOPIN) 0.5 mg tablet Take 0.5 mg by mouth three (3) times daily.  gabapentin (NEURONTIN) 300 mg capsule Take 300 mg by mouth three (3) times daily as needed.  carBAMazepine XR (TEGRETOL XR) 200 mg SR tablet Take 200 mg by mouth two (2) times a day. 200 mg in am; 400 mg at night      meloxicam (MOBIC) 15 mg tablet Take 15 mg by mouth daily.  lamotrigine (LAMICTAL) 100 mg tablet Take 200 mg by mouth daily.  PRAZOSIN 4 mg by Does Not Apply route nightly. Does not know the dose takes 3 pills a day      lithium carbonate (LITHOBID) 150 mg capsule Take 600 mg by mouth two (2) times daily (with meals).  SYNTHROID 75 mcg Tab take 75 mcg by mouth daily. No current facility-administered medications on file prior to encounter. Date Last Reviewed: 1/30/2017   Social History/Home Situation: 15 steps unilateral railing to get into apartment   Work/Activity History: on disability (in 2000)  OBJECTIVE:  Reassessment on 1/9/2017  Tool Used: Fibromyalgia Impact Questionnaire  Score:  Initial: 84.84 Most Recent: 81.26 (Date: 1/9/2017)   Interpretation of Score: <40 = Mild Fibromyalgia Syndrome   40-60 = Moderate Fibromyalgia Syndrome   >60-70 = Severe Fibromyalgia Syndrome  Score 0 1-18 19-37 38-57 58-77 78-96 97   Modifier CH CI CJ CK CL CM CN     ? Mobility - Walking and Moving Around:     - CURRENT STATUS: CM - 80%-99% impaired, limited or restricted    - GOAL STATUS:  CL - 60%-79% impaired, limited or restricted    - D/C STATUS:  ---------------To be determined---------------  [place] / [place] / Product Type: Medicare /      Outcome Measure: Tool Used: Modified Fatigue Impact Scale  Score:  Initial: 77 Most Recent: 64 (1/9/2017)   Interpretation of Score: The higher the score equates to a higher level of perceived fatigue. Outcome Measure:    Tool Used: Beck Depression Index  Score:  Initial: 28 Most Recent: 28 (1/9/2017)   Interpretation of Score:  0-9 = No Depression   10-19 = Mild Depression   19-29 = Moderate Depression   >30 = Severe Depression    Observation/Orthostatic Postural Assessment: kyphotic posture, forward head, rounded shoulders  Palpation: significant pain with slight pressure along cervical and lower thoracic and lumbar paraspinals and vertebrae  ROM: WFL but painful at end range         Strength:   Shoulder flexion 4-/5 (pulling pain in center of neck)  Shoulder abduction 4+/5 (pulling pain in center of neck)  Elbow flexion 5/5 (pulling pain in center of neck)  Elbow extension 5/5 (pulling pain in center of neck) Hip flexion 4+/5 (pain in neck due to pt bracing)  Hip abduction NT  Hip extension NT  Knee extension 5/5 (slight pain in anterior R thigh)  Knee flexion NT  Dorsiflexion 4+/5  Plantarflexion 4+/5         Neurological Screen:   Myotomes: WFL  Dermatomes: pt reporting occasional tingling in B hands    Functional Mobility: Patient ambulated 1,290 feet during 6 minute walk test with 0 rest breaks - assessed on 1/9/2017. Balance: good in sitting; good in standing  TREATMENT:   (In addition to Assessment/Re-Assessment sessions the following treatments were rendered)  Therapeutic Exercise: ( 40 minutes):  Exercises per grid below to improve mobility, strength, balance and dynamic movement of arm - bilateral and leg - bilateral to improve functional mobility. Required minimal visual, verbal and manual cues to promote proper body alignment, promote proper body posture and promote proper body mechanics. Progressed resistance, range, repetitions and complexity of movement as indicated.     Date:  1/9/2017 Date:  1/23/2017 Date:  1/30/2017   Activity/Exercise      Nustep L3 resistance with B LEs and UEs for 13 minutes to improve blood flow and mobility L3 resistance with B LEs and UEs for 13 minutes to improve blood flow and mobility L4 resistance with B LEs and UEs for 15 minutes to improve blood flow and mobility   LTR in hooklying 20 reps/1 set each direction with 2-3 second hold 20 reps/1 set each direction with 2-3 second hold    Hamstring stretch in supine 30 second hold x 3 reps each direction; passive stretch by therapist 30 second hold x 3 reps each direction; passive stretch by therapist 30 second hold x 3 reps each direction; passive stretch by therapist   Piriformis stretch in supine  30 second hold x 3 reps each direction; passive stretch by therapist 30 second hold x 3 reps each direction; passive stretch by therapist   Posterior pelvic tilt in hooklying      Transversus abdominus (TA) activation      Bridging in hooklying  20 reps/1 set with cues to increase hip extension AROM 20 reps/1 set with cues to increase hip extension AROM   Postural exercises in sitting      Abdominal breathing in hooklying      Cervical forward flexion stretch 30 second hold x 5 reps each side     Cervical lateral flexion stretch 30 second hold x 3 reps each side     Ambulation over level ground 6 minutes per assessment above     Postural exercises in sitting Cues for more upright posture with less shoulder elevation in sitting Cues for more upright posture with more anterior pelvic tilt in sitting    Scapular retraction in sitting  5 reps/1 set with 2-3 second hold; cues to avoid shoulder elevation    Standing shoulder rows  10 reps/1 set with cues for increased scapular retraction; cues for correct technique 15 reps/1 set with cues for increased scapular retraction; cues for correct technique; yellow theratubing   Standing shoulder extension  10 reps/1 set with cues for maintaining elbow extension throughout 15 reps/1 set with cues for maintaining elbow extension throughout; pink theratubing resistance   Sidelying hip abduction   7 reps/1 set each LE; cues for correct technique           HEP: As above; handouts given to patient for all exercises.   Patient/Family Education:     [x] Benefits of Exercise    [x] Pain Management    [x] Nutrition    [x] Improving sleep    [x] Body Mechanics    [x] Fatigue    [x] Grief/Depression/Stress   Therapeutic Modalities:   ____________________________________________________________________________________________________________  Treatment Assessment:  Pt demonstrating significantly improved posture this session. She seemed to have improved mood as well. Pain at end of treatment: pt reporting no increase in pain    Progression/Medical Necessity:   · Patient is expected to demonstrate progress in strength, range of motion, balance and functional technique to improve ability to perform pain-free ADLs/IADLs and to improve overall quality of life. · Patient demonstrates good rehab potential due to higher previous functional level. Compliance with Program/Exercises: Good compliance with attending scheduled sessions and performing HEP. Reason for Continuation of Services/Other Comments:  · Patient continues to require modification of therapeutic interventions to increase complexity of exercises. Recommendations/Intent for next treatment session: \"Treatment next visit will focus on advancements to more challenging activities and reduction in assistance provided\".   FIBRO PROGRAM  Total Treatment Duration:  PT Patient Time In/Time Out  Time In: 1300  Time Out: [address] , DPT

## 2017-01-31 ENCOUNTER — HOSPITAL ENCOUNTER (OUTPATIENT)
Dept: PHYSICAL THERAPY | Age: 42
Discharge: HOME | End: 2017-01-31
Payer: MEDICARE

## 2017-01-31 PROCEDURE — 97113 AQUATIC THERAPY/EXERCISES: CPT

## 2017-01-31 NOTE — PROGRESS NOTES
Alva Drew   (:1975) [facility] at  [address]  [phone]   [phone]       Outpatient PHYSICAL THERAPY: Daily Note and Aquatic Note  Fall Risk Score: 2 (? 5 = High Risk)  ICD-10: Treatment Diagnosis: Fibromyalgia (M79.7)                 Cervicalgia (M54.2)           Low back pain (M54.5)           REFERRING PHYSICIAN: Fermin Hernandez, *  MD Orders: Evaluate and treat   Return Physician Appointment: none specified  MEDICAL/REFERRING DIAGNOSIS: Fibromyalgia [M79.7]  DATE OF ONSET: chronic problem  PRIOR LEVEL OF FUNCTION: independent  PRECAUTIONS/ALLERGIES: buproprion, haldol, latuda, paxil, sulfa, wellbutrin, zoloft  ASSESSMENT:  ?????? ? ? This section established at most recent assessment??????????  Alva Drew has now attended 9 physical therapy sessions for chronic pain associated with a diagnosis of fibromyalgia. This session, she demonstrated improved activity tolerance and improved functional mobility. Despite the above listed improvements, she continues to demonstrate decreased B UE/LE strength/endurance, pain with end range mobility at extremities, decreased functional mobility, and decreased activity tolerance. Pt may continue to benefit from skilled PT to address the above listed deficits to improve ability to perform pain-free ADLs/IADLs and to improve overall quality of life prior to discharge. PROBLEM LIST (Impairments causing functional limitations):  1. Decreased Strength affecting function  2. Edema affecting function  3. Decreased ADL/Functional Activities  4. Decreased Flexibility/joint mobility  5. Decreased transfer abilities  6. Increased Pain affecting function  7. Decreased Activity tolerance   8. Decreased Pacing skills  9. Decreased Work Simplification/Energy Conservation techniques  10.  Increased Fatigue affecting function  GOALS: (Goals have been discussed and agreed upon with patient.)  SHORT-TERM FUNCTIONAL GOALS: Time Frame: 4 weeks  1. Patient will be compliant with HEP. (MET 1/9/2017)  2. Patient will have a decrease in average 24 hour pain level to 4/10 in order to improve functional mobility. (PROGRESSING 1/9/2017)  3. Patient will have a decrease on the FIQ to 79 in order to improve their quality of life. (PROGRESSING 1/9/2017)  4. Patient will have an increase on the 6 minute walk to 1,200 feet in order to indicate improve endurance. (MET 1/9/2017)  5. Pataient will verbalize 3 ways to improve body mechanics in order to reduce pain. (PROGRESSING 1/9/2017)  DISCHARGE GOALS: Time Frame: 8 weeks  1. Patient will be independent with HEP. 2. Patient will have a decrease in average 24 hour pain level to 3/10 in order to improve functional mobility. 3. Patient will have a decrease on the FIQ to 74 in order to improve their quality of life. 4. Patient will have a decrease on the fatigue scale to 67 in order to complete ADLs and IADLs. 5. Patient will have an increase on the 6 minute walk to 1,300 feet in order to demonstrate improved endurance. REHABILITATION POTENTIAL FOR STATED GOALS: [facility] OF CARE:  INTERVENTIONS PLANNED: (Benefits and precautions of physical therapy have been discussed with the patient.)  1. balance exercise  2. bed mobility  3. cold  4. family education  5. gait training  6. heat  7. home exercise program (HEP)  8. manual therapy  9. neuromuscular re-education/strengthening  10. range of motion: active/assisted/passive  11. therapeutic activities  12. therapeutic exercise/strengthening  13. transcutaneous electrical nerve stimulation (TENs)  14. transfer training  15. ultrasound  16. Aquatic therapy  TREATMENT PLAN EFFECTIVE DATES: 12/6/2016 TO 3/6/2016  FREQUENCY/DURATION: Follow patient 2 times a week for 8 weeks to address above goals.   Regarding Alva Drew's therapy, I certify that the treatment plan above will be carried out by a therapist or under their direction. Thank you for this referral,  Kaitlin Mayes PTA     Referring Physician Signature: Dwaine Munoz, *          Date           SUBJECTIVE:  History of Present Injury/Illness (Reason for Referral): Pt states she started having chronic pain after she got thrown off of horse when she was in her late 25s. She was diagnosed with fibromyalgia 2 years ago. Pt states she has flare-ups of fibromyalgia when it gets cold (hands, hips, arms, scapular regions), but her lower back hurts all the time. Entire back hurts all the time. States she sometimes gets shooting pains down her anterior legs that start at her hips. Pt states fibromyalgia symptoms significantly worsened 4 months ago when her dad  and she was admitted to [facility]. Pain at worst is 9/10 (aggravating activities include standing/walking for prolonged periods of time, sitting down on surfaces that are too hard/soft, going up/down stairs). Pain at best is almost 0/10 (eases pain with heat, OTC and prescribed pain medications). Present Symptoms: Patient reports pain when she first got up was 6/10, but after 2 pain pills it is 4/10 as aquatic therapy begins. Dominant Side: right  Past Medical History: anxiety, bipolar disorder, depression, endocrine disease (hypothyroid), gastrointestinal disorder (esophageal ulcer), hypothyroidism, psychiatric disorder (bipolar), seizures    Current Medications: updated list in paper chart  Current Outpatient Prescriptions on File Prior to Encounter   Medication Sig Dispense Refill    ciprofloxacin HCl (CIPRO) 500 mg tablet Take 1 Tab by mouth two (2) times a day. 20 Tab 0    pantoprazole (PROTONIX) 40 mg tablet Take 40 mg by mouth daily.  QUEtiapine (SEROQUEL) 100 mg tablet Take 150 mg by mouth nightly.  folic acid (FOLVITE) 1 mg tablet Take  by mouth daily.  risperiDONE (RISPERDAL) 2 mg tablet Take 2 mg by mouth nightly.       clonazePAM (KLONOPIN) 0.5 mg tablet Take 0.5 mg by mouth three (3) times daily.  gabapentin (NEURONTIN) 300 mg capsule Take 300 mg by mouth three (3) times daily as needed.  carBAMazepine XR (TEGRETOL XR) 200 mg SR tablet Take 200 mg by mouth two (2) times a day. 200 mg in am; 400 mg at night      meloxicam (MOBIC) 15 mg tablet Take 15 mg by mouth daily.  lamotrigine (LAMICTAL) 100 mg tablet Take 200 mg by mouth daily.  PRAZOSIN 4 mg by Does Not Apply route nightly. Does not know the dose takes 3 pills a day      lithium carbonate (LITHOBID) 150 mg capsule Take 600 mg by mouth two (2) times daily (with meals).  SYNTHROID 75 mcg Tab take 75 mcg by mouth daily. No current facility-administered medications on file prior to encounter. Date Last Reviewed: 1/31/2017   Social History/Home Situation: 15 steps unilateral railing to get into apartment   Work/Activity History: on disability (in 2000)  OBJECTIVE:  Reassessment on 1/9/2017  Tool Used: Fibromyalgia Impact Questionnaire  Score:  Initial: 84.84 Most Recent: 81.26 (Date: 1/9/2017)   Interpretation of Score: <40 = Mild Fibromyalgia Syndrome   40-60 = Moderate Fibromyalgia Syndrome   >60-70 = Severe Fibromyalgia Syndrome  Score 0 1-18 19-37 38-57 58-77 78-96 97   Modifier CH CI CJ CK CL CM CN     ? Mobility - Walking and Moving Around:     - CURRENT STATUS: CM - 80%-99% impaired, limited or restricted    - GOAL STATUS:  CL - 60%-79% impaired, limited or restricted    - D/C STATUS:  ---------------To be determined---------------  [place] / [place] PART [place] AND [place] / Product Type: Medicare /      Outcome Measure: Tool Used: Modified Fatigue Impact Scale  Score:  Initial: 77 Most Recent: 64 (1/9/2017)   Interpretation of Score: The higher the score equates to a higher level of perceived fatigue. Outcome Measure:    Tool Used: Beck Depression Index  Score:  Initial: 28 Most Recent: 28 (1/9/2017)   Interpretation of Score:  0-9 = No Depression   10-19 = Mild Depression   19-29 = Moderate Depression   >30 = Severe Depression    Observation/Orthostatic Postural Assessment: kyphotic posture, forward head, rounded shoulders  Palpation: significant pain with slight pressure along cervical and lower thoracic and lumbar paraspinals and vertebrae  ROM: WFL but painful at end range         Strength:   Shoulder flexion 4-/5 (pulling pain in center of neck)  Shoulder abduction 4+/5 (pulling pain in center of neck)  Elbow flexion 5/5 (pulling pain in center of neck)  Elbow extension 5/5 (pulling pain in center of neck) Hip flexion 4+/5 (pain in neck due to pt bracing)  Hip abduction NT  Hip extension NT  Knee extension 5/5 (slight pain in anterior R thigh)  Knee flexion NT  Dorsiflexion 4+/5  Plantarflexion 4+/5         Neurological Screen:   Myotomes: WFL  Dermatomes: pt reporting occasional tingling in B hands    Functional Mobility: Patient ambulated 1,290 feet during 6 minute walk test with 0 rest breaks - assessed on 1/9/2017. Balance: good in sitting; good in standing  TREATMENT:     Aquatic Therapy (55 minutes): Aquatic treatment performed per flow grid for Decreased muscle strength and Decreased endurance.  Cues provided for alignment and to breathe.      Aquatic Exercise Log       Date  12-13-16 Date  12-20-16 Date  12-27-16 Date  1-3-17 Date  1-10-17 1-24-17 1-31-17   Activity/ Exercise Parameters Parameters Parameters Parameters Parameters     Walking forward 2 laps with HHA 2 laps  2 laps 2 laps  2 laps 2 laps 2 laps   Walking backward 2 laps with HHA 2 laps  2 laps 2 laps  2 laps 2 laps 2 laps   Walking sideways 2 laps with HHA 2 laps  2 laps 2 laps  2 laps 2 laps 2 laps      Marching 2 laps with HHA 2 laps 2 laps 2 laps  2 laps 2 laps 2 laps      Goose Step 2 laps with HHA 2 laps 2 laps 2 laps  2 laps 2 laps 2 laps      Tip toes     2 laps 2 laps  2 laps 2 laps 2 laps      Heels                  Lunges               Side step squats               LE Exercises noodle noodle noodle noodle  noodle noodle noodle      Hip Flex/Ext Marching x 10 B Marching x 10 B Marching x 12 B Marching x 15 B  Marching x 20 B Marching x 20 B Marching x 20 B      Hip Abd/Add X 10 B X 10 B X 12 B X 15 B  x 20 B X 20 B X 20 B      Hip IR/ER                  Calf raises                  Knee Flex                  Squats                  Leg Circles            X 10 each way each leg      Step Ups               UE Exercises noodle noodle noodle noodle  noodle noodle noodle      Squeeze In X 10 B X 10 B X 12 B X 15 B  x 20 B X 20 B X 20 B      Push Down X 10 B X 10 B X 12 B X 15 B  x 20 B X 20 B X 20 B      Pull Down X 10 B X 10 B   X 15 B  x 20 B        Bicep/Tricep       X 10 B  x 20 B     Rows/Press outs   Push ups x 10 B Push ups x 12 B Push ups   X 15   Resisted press outs and rows x 15  push ups x 20  Push ups x 20      Chi Positions                  Trunk Rotation       With noodle x 10    Jump rope x 15     Deep H2O/ Noodles 4 to 5 feet with noodles 4-5 feet with noodles 4-5 feet shallow  shallow  4-5 feet 4-5 feet      Stabilization Core stability                Arms only     Straddle - 2 laps Straddle - 2 laps  straddle  - 2 laps Straddle - 2 laps Straddle - 2 laps      Legs only Under arms- bicycle x 2 minutes Straddle - 4 laps with arms and legs  Under arms- bicycle Straddle - 2 laps     Both - 2 laps Straddle - 2 laps     Both - 2 laps  straddle - 2 laps    Both - 2 laps Straddle - 2 laps    Both - 2 laps Straddle - 2 laps    Both - 2 laps   Cross   [place] 2 x 20  2 x 20  X 20      X 20  2 x 20       Scissors 2 x 20  2 x 20  X 20      X 20  2 x 20       Crab walk   2 laps 2 laps 2 laps  Clams x 15 B  Reverse clams x 15 B  2 laps   Clams x 20 B  Reverse clams x 20 B 2 laps   Clams x 20   Reverse clams x 20  4 laps       Lower abdominal   work  X 10  2 sequences x 10 each way 2 sequences x 10 each way      2 sequences x 10 each way      Cardio              Jogging   While punching noodles down by sides        Slight jog x 4 lengths of shallow end    Side leaps - x 2 lengths each direction   Lap   Swimming                  Stretches [] In Pool  [x]  Whirlpool* [x] In Pool  [] Whirlpool* [] In Pool  [x] Whirlpool* [] In Pool  [x]  Whirlpool* [] In Pool  [x] Whirlpool* whirlpool On step in pool      Hamstrings X 3 B X 3 B X 3 B X 3 B X 3 B X 3 B X 3 B      Heelcords X 3 B X 3 B X 3 B X 3 B  x 3 B X 3 B X 3 B      Piriformis X 3 B X 3 B X 3 B X 3 B  x 3 B X 3 B X 3 B      Neck                                  * For increased soft tissue extensibility a warm water (over 100°F) environment was utilized. Supervision/monitoring was provided at all times. ____________________________________________________________________________________________________________  Treatment Assessment:  Patient reports she is able to do more when out and about, but still gets nervous around big crowds. No other complaints. Continue per Plan of care. Pain at end of treatment: 3/10    Progression/Medical Necessity:   · Patient is expected to demonstrate progress in strength, range of motion, balance and functional technique to improve ability to perform pain-free ADLs/IADLs and to improve overall quality of life. · Patient demonstrates good rehab potential due to higher previous functional level. Compliance with Program/Exercises: Good compliance with attending scheduled sessions and performing HEP. Reason for Continuation of Services/Other Comments:  · Patient continues to require modification of therapeutic interventions to increase complexity of exercises. Recommendations/Intent for next treatment session: \"Treatment next visit will focus on advancements to more challenging activities and reduction in assistance provided\".   FIBRO PROGRAM  Total Treatment Duration: 55 minutes   PT Patient Time In/Time Out  Time In: 1250  Time Out: [address] Araseli Santoyo, PTA  1/31/2017

## 2017-02-06 ENCOUNTER — APPOINTMENT (OUTPATIENT)
Dept: PHYSICAL THERAPY | Age: 42
End: 2017-02-06

## 2017-02-07 ENCOUNTER — HOSPITAL ENCOUNTER (OUTPATIENT)
Dept: PHYSICAL THERAPY | Age: 42
Discharge: HOME | End: 2017-02-07

## 2017-02-07 NOTE — PROGRESS NOTES
Alva Drew  : 1975 [facility] at [address]  [phone]   [phone]          2017   Patient called to cancel her aquatic therapy appointment due to the fact that her grandmother was not doing well and needed to go visit. Will follow up with the patient at the next visit.   Jazmin Mcgraw, PTA

## 2017-02-13 ENCOUNTER — HOSPITAL ENCOUNTER (OUTPATIENT)
Dept: PHYSICAL THERAPY | Age: 42
Discharge: HOME | End: 2017-02-13

## 2017-02-13 NOTE — PROGRESS NOTES
[facility] at [address]  [phone]   [phone]     OUTPATIENT DAILY NOTE    NAME: Alva Drew    DATE: 2/13/2017    Patient canceled physical therapy appointment today due to family member still being sick. Will plan to follow up on next scheduled visit.     REGI McclendonT

## 2017-02-14 ENCOUNTER — HOSPITAL ENCOUNTER (OUTPATIENT)
Dept: PHYSICAL THERAPY | Age: 42
Discharge: HOME | End: 2017-02-14

## 2017-02-14 NOTE — PROGRESS NOTES
[facility] at [address]  [phone]   [phone]     OUTPATIENT DAILY NOTE    NAME: Alva Drew    DATE: 2/14/2017    Patient canceled her aquatic physical therapy appointment today due to family member still being sick. Will plan to follow up on next scheduled visit.     Daxa Lora, PTA

## 2017-02-20 NOTE — PROGRESS NOTES
Alva DAMON Drew  : 1975 [facility] at [address] [place] [address], [address] [facility]  [phone]   [phone]          2017        Patient called and stated her grandmother passed yesterday. She apologized for not calling this morning, but wanted to cancel her aquatic session for tomorrow. I did let her know that she did not have any other appointments scheduled and to call us when she got back in town to schedule more visits.   Flor Thompson, PTA

## 2017-02-21 ENCOUNTER — HOSPITAL ENCOUNTER (OUTPATIENT)
Dept: PHYSICAL THERAPY | Age: 42
Discharge: HOME | End: 2017-02-21

## 2017-02-22 NOTE — PROGRESS NOTES
Alva Drew  : 1975 [facility] at [address]  [phone]   [phone]              17         Patient called Monday to cancel her aquatic appointment for Tuesday due to a death in the family. She reports she will be back in town next week.   Fani Avina, PTA

## 2017-03-06 ENCOUNTER — HOSPITAL ENCOUNTER (OUTPATIENT)
Dept: PHYSICAL THERAPY | Age: 42
Discharge: HOME | End: 2017-03-06
Payer: MEDICARE

## 2017-03-06 PROCEDURE — G8979 MOBILITY GOAL STATUS: HCPCS

## 2017-03-06 PROCEDURE — G8978 MOBILITY CURRENT STATUS: HCPCS

## 2017-03-06 PROCEDURE — 97110 THERAPEUTIC EXERCISES: CPT

## 2017-03-06 NOTE — PROGRESS NOTES
Alva Drew   (:1975) [facility] at  [address]  [phone]   [phone]       Outpatient PHYSICAL THERAPY: Recertification  Fall Risk Score: 2 (? 5 = High Risk)  ICD-10: Treatment Diagnosis: Fibromyalgia (M79.7)                 Cervicalgia (M54.2)           Low back pain (M54.5)           REFERRING PHYSICIAN: Susannah Bay MD Orders: Evaluate and treat   Return Physician Appointment: none specified  MEDICAL/REFERRING DIAGNOSIS: Fibromyalgia [M79.7]  DATE OF ONSET: chronic problem  PRIOR LEVEL OF FUNCTION: independent  PRECAUTIONS/ALLERGIES: buproprion, haldol, latuda, paxil, sulfa, wellbutrin, zoloft  ASSESSMENT:  ?????? ? ? This section established at most recent assessment??????????  Alva Drew has now attended 15 physical therapy sessions for chronic pain associated with a diagnosis of fibromyalgia. This session, she demonstrated improved functional mobility and decreased levels of fatigue (since initial eval). Despite the above listed improvements, she continues to demonstrate decreased B UE/LE strength/endurance, pain with end range mobility at extremities, decreased functional mobility, and decreased activity tolerance. Pt may continue to benefit from skilled PT to address the above listed deficits to improve ability to perform pain-free ADLs/IADLs and to improve overall quality of life prior to discharge. Due to pt missing a couple of weeks (secondary to fatally ill grandmother), will extend pt's POC several days to allow her to finish the fibromyalgia program.     PROBLEM LIST (Impairments causing functional limitations):  1. Decreased Strength affecting function  2. Edema affecting function  3. Decreased ADL/Functional Activities  4. Decreased Flexibility/joint mobility  5. Decreased transfer abilities  6. Increased Pain affecting function  7. Decreased Activity tolerance   8.  Decreased Pacing skills  9. Decreased Work Simplification/Energy Conservation techniques  10. Increased Fatigue affecting function  GOALS: (Goals have been discussed and agreed upon with patient.)  SHORT-TERM FUNCTIONAL GOALS: Time Frame: 4 weeks  1. Patient will be compliant with HEP. (MET 1/9/2017)  2. Patient will have a decrease in average 24 hour pain level to 4/10 in order to improve functional mobility. (NOT MET 3/6/2017)  3. Patient will have a decrease on the FIQ to 79 in order to improve their quality of life. (MET 3/6/2017)  4. Patient will have an increase on the 6 minute walk to 1,200 feet in order to indicate improve endurance. (MET 1/9/2017)  5. Pataient will verbalize 3 ways to improve body mechanics in order to reduce pain. (NOT MET 3/6/2017)  DISCHARGE GOALS: Time Frame: 8 weeks  1. Patient will be independent with HEP. (NOT MET 3/6/2017)  2. Patient will have a decrease in average 24 hour pain level to 3/10 in order to improve functional mobility. (NOT MET 3/6/2017)  3. Patient will have a decrease on the FIQ to 74 in order to improve their quality of life. (NOT MET 3/6/2017)  4. Patient will have a decrease on the fatigue scale to 67 in order to complete ADLs and IADLs. (MET 3/6/2017)  5. Patient will have an increase on the 6 minute walk to 1,300 feet in order to demonstrate improved endurance. (NOT MET 3/6/2017)  REHABILITATION POTENTIAL FOR STATED GOALS: GoodPLAN OF CARE:  INTERVENTIONS PLANNED: (Benefits and precautions of physical therapy have been discussed with the patient.)  1. balance exercise  2. bed mobility  3. cold  4. family education  5. gait training  6. heat  7. home exercise program (HEP)  8. manual therapy  9. neuromuscular re-education/strengthening  10. range of motion: active/assisted/passive  11. therapeutic activities  12. therapeutic exercise/strengthening  13. transcutaneous electrical nerve stimulation (TENs)  14. transfer training  15. ultrasound  16.  Aquatic therapy  TREATMENT PLAN EFFECTIVE DATES: 3/6/2016 TO 3/10/2016  FREQUENCY/DURATION: Follow patient 2 times a week for 8 weeks to address above goals. Regarding Alva Drew's therapy, I certify that the treatment plan above will be carried out by a therapist or under their direction. Thank you for this referral,  Wilberto Argueta DPT     Referring Physician Signature: Angelica Gaines, *          Date           SUBJECTIVE:  History of Present Injury/Illness (Reason for Referral): Pt states she started having chronic pain after she got thrown off of horse when she was in her late 25s. She was diagnosed with fibromyalgia 2 years ago. Pt states she has flare-ups of fibromyalgia when it gets cold (hands, hips, arms, scapular regions), but her lower back hurts all the time. Entire back hurts all the time. States she sometimes gets shooting pains down her anterior legs that start at her hips. Pt states fibromyalgia symptoms significantly worsened 4 months ago when her dad  and she was admitted to [facility]. Pain at worst is 9/10 (aggravating activities include standing/walking for prolonged periods of time, sitting down on surfaces that are too hard/soft, going up/down stairs). Pain at best is almost 0/10 (eases pain with heat, OTC and prescribed pain medications).    Present Symptoms: states her pain is 5/10 in B LEs and hips and back at beginning of session; her grandmother was very sick and  over the past couple of weeks; states the medication she is taking for bronchitis is making her feel worse     Dominant Side: right  Past Medical History: anxiety, bipolar disorder, depression, endocrine disease (hypothyroid), gastrointestinal disorder (esophageal ulcer), hypothyroidism, psychiatric disorder (bipolar), seizures    Current Medications: updated list in paper chart;   Current Outpatient Prescriptions on File Prior to Encounter   Medication Sig Dispense Refill    ciprofloxacin HCl (CIPRO) 500 mg tablet Take 1 Tab by mouth two (2) times a day. 20 Tab 0    pantoprazole (PROTONIX) 40 mg tablet Take 40 mg by mouth daily.  QUEtiapine (SEROQUEL) 100 mg tablet Take 150 mg by mouth nightly.  folic acid (FOLVITE) 1 mg tablet Take  by mouth daily.  risperiDONE (RISPERDAL) 2 mg tablet Take 2 mg by mouth nightly.  clonazePAM (KLONOPIN) 0.5 mg tablet Take 0.5 mg by mouth three (3) times daily.  gabapentin (NEURONTIN) 300 mg capsule Take 300 mg by mouth three (3) times daily as needed.  carBAMazepine XR (TEGRETOL XR) 200 mg SR tablet Take 200 mg by mouth two (2) times a day. 200 mg in am; 400 mg at night      meloxicam (MOBIC) 15 mg tablet Take 15 mg by mouth daily.  lamotrigine (LAMICTAL) 100 mg tablet Take 200 mg by mouth daily.  PRAZOSIN 4 mg by Does Not Apply route nightly. Does not know the dose takes 3 pills a day      lithium carbonate (LITHOBID) 150 mg capsule Take 600 mg by mouth two (2) times daily (with meals).  SYNTHROID 75 mcg Tab take 75 mcg by mouth daily. No current facility-administered medications on file prior to encounter. Date Last Reviewed: 3/6/2017   Social History/Home Situation: 15 steps unilateral railing to get into apartment   Work/Activity History: on disability (in 2000)  OBJECTIVE:  Reassessment on 3/6/2017  Tool Used: Fibromyalgia Impact Questionnaire  Score:  Initial: 84.84 Most Recent: 78.47 (Date: 3/6/2017)   Interpretation of Score: <40 = Mild Fibromyalgia Syndrome   40-60 = Moderate Fibromyalgia Syndrome   >60-70 = Severe Fibromyalgia Syndrome  Score 0 1-18 19-37 38-57 58-77 78-96 97   Modifier CH CI CJ CK CL CM CN     ?  Mobility - Walking and Moving Around:     - CURRENT STATUS: CM - 80%-99% impaired, limited or restricted    - GOAL STATUS:  CL - 60%-79% impaired, limited or restricted    - D/C STATUS:  CM - 80%-99% impaired, limited or restricted  [place] / [place] / Product Type: Medicare / Outcome Measure: Tool Used: Modified Fatigue Impact Scale  Score:  Initial: 77 Most Recent: 66 (3/6/2017)   Interpretation of Score: The higher the score equates to a higher level of perceived fatigue. Outcome Measure: Tool Used: Randle Depression Index  Score:  Initial: 28 Most Recent: 28 (3/6/2017)   Interpretation of Score:  0-9 = No Depression   10-19 = Mild Depression   19-29 = Moderate Depression   >30 = Severe Depression    Observation/Orthostatic Postural Assessment: kyphotic posture, forward head, rounded shoulders - slightly improved upright posture on 3/6/2017  Palpation: significant pain with slight pressure along cervical and lower thoracic and lumbar paraspinals and vertebrae  ROM: WFL but painful at end range         Strength:   Shoulder flexion 4-/5 (pulling pain in center of neck)  Shoulder abduction 4+/5 (pulling pain in center of neck)  Elbow flexion 5/5 (pulling pain in center of neck)  Elbow extension 5/5 (pulling pain in center of neck) Hip flexion 4+/5 (pain in neck due to pt bracing)  Hip abduction NT  Hip extension NT  Knee extension 5/5 (slight pain in anterior R thigh)  Knee flexion NT  Dorsiflexion 4+/5  Plantarflexion 4+/5         Neurological Screen:   Myotomes: WFL  Dermatomes: pt reporting occasional tingling in B hands    Functional Mobility: Patient ambulated 1,290 feet during 6 minute walk test with 0 rest breaks - assessed on 3/6/2017. Balance: good in sitting; good in standing  TREATMENT:   (In addition to Assessment/Re-Assessment sessions the following treatments were rendered)  Therapeutic Exercise: ( 40 minutes):  Exercises per grid below to improve mobility, strength, balance and dynamic movement of arm - bilateral and leg - bilateral to improve functional mobility. Required minimal visual, verbal and manual cues to promote proper body alignment, promote proper body posture and promote proper body mechanics.   Progressed resistance, range, repetitions and complexity of movement as indicated.     Date:  1/23/2017 Date:  1/30/2017 Date:  3/6/2017   Activity/Exercise      Nustep L3 resistance with B LEs and UEs for 13 minutes to improve blood flow and mobility L4 resistance with B LEs and UEs for 15 minutes to improve blood flow and mobility L2 resistance with B LEs and UEs for 10 minutes to improve blood flow and mobility   LTR in hooklying 20 reps/1 set each direction with 2-3 second hold  20 reps/1 set each direction with 2-3 second hold   Hamstring stretch in supine 30 second hold x 3 reps each direction; passive stretch by therapist 30 second hold x 3 reps each direction; passive stretch by therapist 30 second hold x 3 reps each direction; passive stretch by therapist   Piriformis stretch in supine 30 second hold x 3 reps each direction; passive stretch by therapist 30 second hold x 3 reps each direction; passive stretch by therapist 30 second hold x 3 reps each direction; passive stretch by therapist   Posterior pelvic tilt in hooklying      Transversus abdominus (TA) activation      Bridging in hooklying 20 reps/1 set with cues to increase hip extension AROM 20 reps/1 set with cues to increase hip extension AROM    Postural exercises in sitting      Abdominal breathing in hooklying      Cervical forward flexion stretch      Cervical lateral flexion stretch      Ambulation over level ground   Per 6 minute walk test for assessment above   Postural exercises in sitting Cues for more upright posture with more anterior pelvic tilt in sitting     Scapular retraction in sitting 5 reps/1 set with 2-3 second hold; cues to avoid shoulder elevation     Standing shoulder rows 10 reps/1 set with cues for increased scapular retraction; cues for correct technique 15 reps/1 set with cues for increased scapular retraction; cues for correct technique; yellow theratubing    Standing shoulder extension 10 reps/1 set with cues for maintaining elbow extension throughout 15 reps/1 set with cues for maintaining elbow extension throughout; pink theratubing resistance    Sidelying hip abduction  7 reps/1 set each LE; cues for correct technique    Santy test stretch   30 second hold x 3 reps on L LE     HEP: As above; handouts given to patient for all exercises. Patient/Family Education:     [x] Benefits of Exercise    [x] Pain Management    [x] Nutrition    [x] Improving sleep    [x] Body Mechanics    [x] Fatigue    [x] Grief/Depression/Stress   Therapeutic Modalities:   ____________________________________________________________________________________________________________  Treatment Assessment:  See assessment above. Pain at end of treatment: pt reporting no increase in pain    Progression/Medical Necessity:   · Patient is expected to demonstrate progress in strength, range of motion, balance and functional technique to improve ability to perform pain-free ADLs/IADLs and to improve overall quality of life. · Patient demonstrates good rehab potential due to higher previous functional level. Compliance with Program/Exercises: Good compliance with attending scheduled sessions and performing HEP (poor compliance for past 2 weeks due to pt having to spend time with deathly ill grandmother). Reason for Continuation of Services/Other Comments:  · Patient continues to require modification of therapeutic interventions to increase complexity of exercises. Recommendations/Intent for next treatment session: \"Treatment next visit will focus on advancements to more challenging activities and reduction in assistance provided\".   FIBRO PROGRAM  Total Treatment Duration:  PT Patient Time In/Time Out  Time In: 1015  Time Out: [address], DPJEAN-PAUL

## 2017-03-07 ENCOUNTER — HOSPITAL ENCOUNTER (OUTPATIENT)
Dept: PHYSICAL THERAPY | Age: 42
Discharge: HOME | End: 2017-03-07
Payer: MEDICARE

## 2017-03-07 PROCEDURE — 97113 AQUATIC THERAPY/EXERCISES: CPT
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
## 2017-04-11 ENCOUNTER — HOSPITAL ENCOUNTER (OUTPATIENT)
Dept: LAB | Age: 42
Discharge: HOME | End: 2017-04-11

## 2017-04-11 PROCEDURE — 88312 SPECIAL STAINS GROUP 1: CPT | Performed by: INTERNAL MEDICINE

## 2017-04-11 PROCEDURE — 88305 TISSUE EXAM BY PATHOLOGIST: CPT | Performed by: INTERNAL MEDICINE

## 2017-06-15 ENCOUNTER — HOSPITAL ENCOUNTER (EMERGENCY)
Age: 42
Discharge: HOME | End: 2017-06-15
Attending: EMERGENCY MEDICINE
Payer: MEDICARE

## 2017-06-15 VITALS
BODY MASS INDEX: 41.54 KG/M2 | OXYGEN SATURATION: 97 % | RESPIRATION RATE: 18 BRPM | HEART RATE: 76 BPM | WEIGHT: 220 LBS | TEMPERATURE: 98.3 F | DIASTOLIC BLOOD PRESSURE: 102 MMHG | HEIGHT: 61 IN | SYSTOLIC BLOOD PRESSURE: 189 MMHG

## 2017-06-15 DIAGNOSIS — M54.32 SCIATICA OF LEFT SIDE: Primary | ICD-10-CM

## 2017-06-15 PROCEDURE — 96372 THER/PROPH/DIAG INJ SC/IM: CPT | Performed by: EMERGENCY MEDICINE

## 2017-06-15 PROCEDURE — 99283 EMERGENCY DEPT VISIT LOW MDM: CPT | Performed by: EMERGENCY MEDICINE

## 2017-06-15 PROCEDURE — 74011250636 HC RX REV CODE- 250/636: Performed by: EMERGENCY MEDICINE

## 2017-06-15 RX ORDER — HYDROMORPHONE HYDROCHLORIDE 1 MG/ML
1 INJECTION, SOLUTION INTRAMUSCULAR; INTRAVENOUS; SUBCUTANEOUS
Status: COMPLETED | OUTPATIENT
Start: 2017-06-15 | End: 2017-06-15

## 2017-06-15 RX ORDER — HYDROCODONE BITARTRATE AND ACETAMINOPHEN 5; 325 MG/1; MG/1
1 TABLET ORAL
Qty: 15 TAB | Refills: 0 | Status: SHIPPED | OUTPATIENT
Start: 2017-06-15

## 2017-06-15 RX ADMIN — HYDROMORPHONE HYDROCHLORIDE 1 MG: 1 INJECTION, SOLUTION INTRAMUSCULAR; INTRAVENOUS; SUBCUTANEOUS at 21:30
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
## 2017-07-01 ENCOUNTER — HOSPITAL ENCOUNTER (EMERGENCY)
Age: 42
Discharge: HOME | End: 2017-07-01
Payer: MEDICARE

## 2017-07-01 ENCOUNTER — PATIENT OUTREACH (OUTPATIENT)
Dept: CASE MANAGEMENT | Age: 42
End: 2017-07-01

## 2017-07-01 ENCOUNTER — APPOINTMENT (OUTPATIENT)
Dept: GENERAL RADIOLOGY | Age: 42
End: 2017-07-01
Payer: MEDICARE

## 2017-07-01 VITALS
BODY MASS INDEX: 40.59 KG/M2 | TEMPERATURE: 98.3 F | RESPIRATION RATE: 16 BRPM | HEART RATE: 85 BPM | WEIGHT: 215 LBS | HEIGHT: 61 IN | SYSTOLIC BLOOD PRESSURE: 209 MMHG | DIASTOLIC BLOOD PRESSURE: 87 MMHG | OXYGEN SATURATION: 95 %
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
## 2017-07-01 DIAGNOSIS — S20.212A CONTUSION OF CHEST WALL, LEFT, INITIAL ENCOUNTER: Primary | ICD-10-CM

## 2017-07-01 LAB
ATRIAL RATE: 82 BPM
CALCULATED P AXIS, ECG09: 53 DEGREES
CALCULATED R AXIS, ECG10: 65 DEGREES
CALCULATED T AXIS, ECG11: 74 DEGREES
DIAGNOSIS, 93000: NORMAL
P-R INTERVAL, ECG05: 162 MS
Q-T INTERVAL, ECG07: 402 MS
QRS DURATION, ECG06: 86 MS
QTC CALCULATION (BEZET), ECG08: 469 MS
VENTRICULAR RATE, ECG03: 82 BPM

## 2017-07-01 PROCEDURE — 71020 XR CHEST PA LAT: CPT

## 2017-07-01 PROCEDURE — 93005 ELECTROCARDIOGRAM TRACING: CPT

## 2017-07-01 PROCEDURE — 99284 EMERGENCY DEPT VISIT MOD MDM: CPT

## 2017-07-01 RX ORDER — CYCLOBENZAPRINE HCL 10 MG
10 TABLET ORAL
Qty: 15 TAB | Refills: 0 | Status: SHIPPED | OUTPATIENT
Start: 2017-07-01

## 2017-07-01 RX ORDER — NAPROXEN 500 MG/1
500 TABLET ORAL 2 TIMES DAILY WITH MEALS
Qty: 20 TAB | Refills: 0 | Status: SHIPPED | OUTPATIENT
Start: 2017-07-01 | End: 2017-07-11

## 2017-07-01 NOTE — PROGRESS NOTES
Second outreach attempt to patient was unsuccessful. Third outreach attempt will be made within 5 business days. Care coordinator unable to schedule f/u appt with PCP. Patient does not have PCP on file per [facility] chart review. This note will not be viewable in [address]

## 2017-07-01 NOTE — PROGRESS NOTES
Initial outreach attempt to patient was unsuccessful. Second outreach attempt will be made within 24 hours. This note will not be viewable in [address]
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
## 2017-07-01 NOTE — ED NOTES
Patient verbalizes understanding of discharge instructions, follow up care, and RX  Patient stable at discharge with FRANK

## 2017-07-01 NOTE — DISCHARGE INSTRUCTIONS
Chest Contusion: Care Instructions  Your Care Instructions  A chest contusion, or bruise, is caused by a fall or direct blow to the chest. Car crashes, falls, getting punched, and injury from bicycle handlebars are common causes of chest contusions. A very forceful blow to the chest can injure the heart or blood vessels in the chest, the lungs, the airway, the liver, or the spleen. Pain may be caused by an injury to muscles, cartilage, or ribs. Deep breathing, coughing, or sneezing can increase your pain. Lying on the injured area also can cause pain. Follow-up care is a key part of your treatment and safety. Be sure to make and go to all appointments, and call your doctor if you are having problems. It's also a good idea to know your test results and keep a list of the medicines you take. How can you care for yourself at home? · Rest and protect the injured or sore area. Stop, change, or take a break from any activity that may be causing your pain. · Put ice or a cold pack on the area for 10 to 20 minutes at a time. Put a thin cloth between the ice and your skin. · After 2 or 3 days, if your swelling is gone, apply a heating pad set on low or a warm cloth to your chest. Some doctors suggest that you go back and forth between hot and cold. Put a thin cloth between the heating pad and your skin. · Do not wrap or tape your ribs for support. This may cause you to take smaller breaths, which could increase your risk of pneumonia and lung collapse. · Ask your doctor if you can take an over-the-counter pain medicine, such as acetaminophen (Tylenol), ibuprofen (Advil, Motrin), or naproxen (Aleve). Be safe with medicines. Read and follow all instructions on the label. · Take your medicines exactly as prescribed. Call your doctor if you think you are having a problem with your medicine.   · Gentle stretching and massage may help you feel better after a few days of rest. Stretch slowly to the point just before discomfort begins, then hold the stretch for at least 15 to 30 seconds. Do this 3 or 4 times per day. · As your pain gets better, slowly return to your normal activities. Be patient, because chest bruises can take weeks or months to heal. Any increased pain may be a sign that you need to rest a while longer. When should you call for help? Call 911 anytime you think you may need emergency care. For example, call if:  · You have severe trouble breathing. · You cough up blood. Call your doctor now or seek immediate medical care if:  · You have belly pain. · You are dizzy or lightheaded, or you feel like you may faint. · You develop new symptoms with the chest pain. · Your chest pain gets worse. · You have a fever. · You have some shortness of breath. · You have a cough that brings up mucus from the lungs. Watch closely for changes in your health, and be sure to contact your doctor if:  · Your chest pain is not improving after 1 week. Where can you learn more? Go to http://alva-cecelia.info/. Enter I174 in the search box to learn more about \"Chest Contusion: Care Instructions. \"  Current as of: March 20, 2017  Content Version: 11.3  © 0577-5976 [employer]. Care instructions adapted under license by [employer] (which disclaims liability or warranty for this information). If you have questions about a medical condition or this instruction, always ask your healthcare professional. [place] [address] any warranty or liability for your use of this information.

## 2017-07-02 ENCOUNTER — PATIENT OUTREACH (OUTPATIENT)
Dept: CASE MANAGEMENT | Age: 42
End: 2017-07-02

## 2017-07-02 NOTE — PROGRESS NOTES
Care coordinator will refer patient to Alejandra Valderrama to assign ACO RN due to third hospitalization within the past 6 months. This note will not be viewable in [address]

## 2017-07-02 NOTE — Clinical Note
Referral due to multiple hospitalizations. Patient didn't answer the phone but needs to be referred. She doesn't have a PCP on file. TY!

## 2017-07-06 ENCOUNTER — PATIENT OUTREACH (OUTPATIENT)
Dept: CASE MANAGEMENT | Age: 42
End: 2017-07-06

## 2022-06-22 RX ORDER — ATORVASTATIN CALCIUM 10 MG/1
TABLET, FILM COATED ORAL
COMMUNITY
End: 2022-08-16 | Stop reason: SDUPTHER

## 2022-06-22 RX ORDER — AMLODIPINE BESYLATE 5 MG/1
TABLET ORAL
COMMUNITY
Start: 2021-11-23 | End: 2022-08-16 | Stop reason: SDUPTHER

## 2022-06-28 RX ORDER — OMEPRAZOLE 40 MG/1
CAPSULE, DELAYED RELEASE ORAL
COMMUNITY
Start: 2021-08-03

## 2022-06-28 RX ORDER — LITHIUM CARBONATE 300 MG/1
CAPSULE ORAL
COMMUNITY
End: 2022-08-16

## 2022-06-28 RX ORDER — MELOXICAM 15 MG/1
TABLET ORAL
COMMUNITY
End: 2022-08-17

## 2022-06-28 RX ORDER — LITHIUM CARBONATE 300 MG/1
TABLET, FILM COATED, EXTENDED RELEASE ORAL
COMMUNITY
End: 2022-10-18 | Stop reason: SDUPTHER

## 2022-06-28 RX ORDER — CARBAMAZEPINE 200 MG/1
TABLET ORAL
COMMUNITY
End: 2022-08-16

## 2022-06-28 RX ORDER — BISOPROLOL FUMARATE 5 MG/1
TABLET ORAL
COMMUNITY
End: 2022-08-16

## 2022-06-28 RX ORDER — LAMOTRIGINE 200 MG/1
TABLET ORAL
COMMUNITY
End: 2022-10-18 | Stop reason: SDUPTHER

## 2022-06-28 RX ORDER — LEVOTHYROXINE SODIUM 0.12 MG/1
TABLET ORAL
COMMUNITY
End: 2022-08-16 | Stop reason: SDUPTHER

## 2022-06-28 RX ORDER — GABAPENTIN 400 MG/1
1 CAPSULE ORAL
COMMUNITY
End: 2022-08-16

## 2022-06-28 RX ORDER — BUTALBITAL, ACETAMINOPHEN AND CAFFEINE 50; 325; 40 MG/1; MG/1; MG/1
TABLET ORAL
COMMUNITY
Start: 2018-11-08 | End: 2022-08-16

## 2022-06-28 RX ORDER — PANTOPRAZOLE SODIUM 40 MG/1
TABLET, DELAYED RELEASE ORAL
COMMUNITY
End: 2022-08-16

## 2022-06-28 RX ORDER — ACETAMINOPHEN AND CODEINE PHOSPHATE 120; 12 MG/5ML; MG/5ML
SOLUTION ORAL
COMMUNITY
Start: 2020-03-11 | End: 2022-08-16 | Stop reason: CLARIF

## 2022-06-28 RX ORDER — SILICONES/ADHESIVE TAPE
COMBINATION PACKAGE (EA) TOPICAL
COMMUNITY
End: 2022-08-16

## 2022-06-28 RX ORDER — CLONAZEPAM 1 MG/1
TABLET ORAL
COMMUNITY
End: 2022-09-19 | Stop reason: SDUPTHER

## 2022-07-02 RX ORDER — PREGABALIN 75 MG/1
CAPSULE ORAL
COMMUNITY
End: 2022-08-16 | Stop reason: SDUPTHER

## 2022-07-02 RX ORDER — QUETIAPINE FUMARATE 50 MG/1
TABLET, FILM COATED ORAL
COMMUNITY

## 2022-07-02 RX ORDER — TIZANIDINE HYDROCHLORIDE 4 MG/1
CAPSULE, GELATIN COATED ORAL
COMMUNITY
End: 2022-08-16

## 2022-07-02 RX ORDER — QUETIAPINE FUMARATE 300 MG/1
TABLET, FILM COATED ORAL
COMMUNITY
End: 2022-08-16

## 2022-07-02 RX ORDER — PREGABALIN 100 MG/1
1 CAPSULE ORAL
COMMUNITY
Start: 2022-02-08 | End: 2022-08-16

## 2022-07-02 RX ORDER — QUETIAPINE FUMARATE 400 MG/1
TABLET, FILM COATED ORAL
COMMUNITY
End: 2022-08-17

## 2022-07-02 RX ORDER — PRAZOSIN HYDROCHLORIDE 2 MG/1
2 CAPSULE ORAL
COMMUNITY
End: 2022-08-16

## 2022-07-02 RX ORDER — TRAMADOL HYDROCHLORIDE 50 MG/1
TABLET ORAL
COMMUNITY

## 2022-07-02 RX ORDER — QUETIAPINE FUMARATE 200 MG/1
TABLET, FILM COATED ORAL
COMMUNITY
End: 2022-08-16

## 2022-07-02 RX ORDER — QUETIAPINE FUMARATE 100 MG/1
TABLET, FILM COATED ORAL
COMMUNITY

## 2022-07-11 RX ORDER — TRIAMCINOLONE ACETONIDE 1 MG/G
CREAM TOPICAL
COMMUNITY
Start: 2018-07-23 | End: 2022-08-16

## 2022-07-11 RX ORDER — ZOLPIDEM TARTRATE 10 MG/1
TABLET ORAL
COMMUNITY

## 2022-07-24 PROBLEM — N93.9 ABNORMAL UTERINE BLEEDING (AUB): Status: ACTIVE | Noted: 2022-07-24

## 2022-07-24 PROBLEM — L20.9 ATOPIC DERMATITIS: Status: ACTIVE | Noted: 2022-07-24

## 2022-07-24 PROBLEM — F32.81 PMDD (PREMENSTRUAL DYSPHORIC DISORDER): Status: ACTIVE | Noted: 2022-07-24

## 2022-07-24 PROBLEM — F31.9 AFFECTIVE PSYCHOSIS, BIPOLAR (HCC): Status: ACTIVE | Noted: 2022-07-24

## 2022-07-24 PROBLEM — N94.3 PMS (PREMENSTRUAL SYNDROME): Status: ACTIVE | Noted: 2022-07-24

## 2022-07-24 PROBLEM — K21.9 CHRONIC GERD: Status: ACTIVE | Noted: 2022-07-24

## 2022-07-24 PROBLEM — F43.10 POSTTRAUMATIC STRESS DISORDER: Status: ACTIVE | Noted: 2022-07-24

## 2022-07-24 PROBLEM — E78.49 OTHER HYPERLIPIDEMIA: Status: ACTIVE | Noted: 2022-07-24

## 2022-07-24 PROBLEM — E03.9 ACQUIRED HYPOTHYROIDISM: Status: ACTIVE | Noted: 2022-07-24

## 2022-07-24 PROBLEM — K42.9 UMBILICAL HERNIA WITHOUT OBSTRUCTION AND WITHOUT GANGRENE: Status: ACTIVE | Noted: 2022-07-24

## 2022-07-24 PROBLEM — I10 ESSENTIAL HYPERTENSION: Status: ACTIVE | Noted: 2022-07-24

## 2022-07-24 PROBLEM — R53.82 CHRONIC FATIGUE: Status: ACTIVE | Noted: 2022-07-24

## 2022-07-24 PROBLEM — G43.909 MIGRAINE HEADACHE: Status: ACTIVE | Noted: 2022-07-24

## 2022-07-24 PROBLEM — N95.9 MENOPAUSAL AND PERIMENOPAUSAL DISORDER: Status: ACTIVE | Noted: 2022-07-24

## 2022-07-24 PROBLEM — M54.41 LUMBAGO WITH SCIATICA, RIGHT SIDE: Status: ACTIVE | Noted: 2022-07-24

## 2022-07-24 PROBLEM — G89.29 OTHER CHRONIC PAIN: Status: ACTIVE | Noted: 2022-07-24

## 2022-09-22 PROBLEM — Z01.419 WOMEN'S ANNUAL ROUTINE GYNECOLOGICAL EXAMINATION: Status: ACTIVE | Noted: 2022-09-22

## 2022-09-22 PROBLEM — Z12.31 BREAST CANCER SCREENING BY MAMMOGRAM: Status: ACTIVE | Noted: 2022-09-22

## 2022-09-22 PROBLEM — Z30.9 ENCOUNTER FOR CONTRACEPTIVE MANAGEMENT: Status: ACTIVE | Noted: 2022-09-22

## 2022-09-22 PROBLEM — Z11.51 SCREENING FOR HPV (HUMAN PAPILLOMAVIRUS): Status: ACTIVE | Noted: 2022-09-22

## 2022-09-22 PROBLEM — Z12.4 ENCOUNTER FOR PAPANICOLAOU SMEAR FOR CERVICAL CANCER SCREENING: Status: ACTIVE | Noted: 2022-09-22

## 2022-10-22 PROBLEM — Z11.51 SCREENING FOR HPV (HUMAN PAPILLOMAVIRUS): Status: RESOLVED | Noted: 2022-09-22 | Resolved: 2022-10-22

## 2022-10-22 PROBLEM — Z01.419 WOMEN'S ANNUAL ROUTINE GYNECOLOGICAL EXAMINATION: Status: RESOLVED | Noted: 2022-09-22 | Resolved: 2022-10-22

## 2022-10-22 PROBLEM — Z12.4 ENCOUNTER FOR PAPANICOLAOU SMEAR FOR CERVICAL CANCER SCREENING: Status: RESOLVED | Noted: 2022-09-22 | Resolved: 2022-10-22

## 2023-01-16 PROBLEM — F51.01 PRIMARY INSOMNIA: Status: ACTIVE | Noted: 2023-01-16

## 2023-01-16 PROBLEM — R94.31 PROLONGED QT INTERVAL: Status: ACTIVE | Noted: 2023-01-16

## 2023-01-16 PROBLEM — F31.60 BIPOLAR AFFECTIVE DISORDER, CURRENT EPISODE MIXED (HCC): Status: ACTIVE | Noted: 2023-01-16

## 2023-02-21 ENCOUNTER — OFFICE VISIT (OUTPATIENT)
Dept: OBGYN CLINIC | Age: 48
End: 2023-02-21
Payer: MEDICARE

## 2023-02-21 VITALS
BODY MASS INDEX: 28.67 KG/M2 | WEIGHT: 155.8 LBS | SYSTOLIC BLOOD PRESSURE: 124 MMHG | HEIGHT: 62 IN | DIASTOLIC BLOOD PRESSURE: 74 MMHG

## 2023-02-21 DIAGNOSIS — R39.89 SENSATION OF PRESSURE IN BLADDER AREA: ICD-10-CM

## 2023-02-21 DIAGNOSIS — R35.0 URINARY FREQUENCY: ICD-10-CM

## 2023-02-21 DIAGNOSIS — F32.81 PMDD (PREMENSTRUAL DYSPHORIC DISORDER): Primary | ICD-10-CM

## 2023-02-21 DIAGNOSIS — R39.15 URINARY URGENCY: ICD-10-CM

## 2023-02-21 LAB
BILIRUBIN, URINE, POC: NEGATIVE
BLOOD URINE, POC: NORMAL
GLUCOSE URINE, POC: NEGATIVE
KETONES, URINE, POC: NEGATIVE
LEUKOCYTE ESTERASE, URINE, POC: NEGATIVE
NITRITE, URINE, POC: NEGATIVE
PH, URINE, POC: 7.5 (ref 4.6–8)
PROTEIN,URINE, POC: NEGATIVE
SPECIFIC GRAVITY, URINE, POC: 1.02 (ref 1–1.03)
URINALYSIS CLARITY, POC: NORMAL
URINALYSIS COLOR, POC: YELLOW
UROBILINOGEN, POC: NORMAL

## 2023-02-21 PROCEDURE — G8427 DOCREV CUR MEDS BY ELIG CLIN: HCPCS | Performed by: NURSE PRACTITIONER

## 2023-02-21 PROCEDURE — 3078F DIAST BP <80 MM HG: CPT | Performed by: NURSE PRACTITIONER

## 2023-02-21 PROCEDURE — G8419 CALC BMI OUT NRM PARAM NOF/U: HCPCS | Performed by: NURSE PRACTITIONER

## 2023-02-21 PROCEDURE — 99204 OFFICE O/P NEW MOD 45 MIN: CPT | Performed by: NURSE PRACTITIONER

## 2023-02-21 PROCEDURE — 3074F SYST BP LT 130 MM HG: CPT | Performed by: NURSE PRACTITIONER

## 2023-02-21 PROCEDURE — 81003 URINALYSIS AUTO W/O SCOPE: CPT | Performed by: NURSE PRACTITIONER

## 2023-02-21 PROCEDURE — G8482 FLU IMMUNIZE ORDER/ADMIN: HCPCS | Performed by: NURSE PRACTITIONER

## 2023-02-21 PROCEDURE — 4004F PT TOBACCO SCREEN RCVD TLK: CPT | Performed by: NURSE PRACTITIONER

## 2023-02-21 NOTE — PROGRESS NOTES
The patient is a 56 y.o. [id number] who is seen for   To establish care. Pt recently moved from [place]. Pt states she is taking norethindrone (micronor) due to severe PMDD. Pt states she has lost her prescription and is needing a refill on it. She states that she has had psych hospitalization 20+ times due to severe PMDD  States her moods and PMDD sx are very well controlled on Micronor and wishes to continue. She unfortunately lost her pills during her recent move and is requesting refill today. Pt c/o pressure pain in pelvic area when needing to urinate but nothing is coming out. No dysuria + frequency, she does leak urine nightly when she experiences urgency and is unable to make it to the bathroom in time. This has been going on for a couple of months. States this has been ongoing intermittently for a couple years. Pap 9/2022 wnl and hpv neg      HISTORY:    [id number]  Patient's last menstrual period was 12/28/2022 (approximate). Sexual History:  single partner, contraception - OCP (POP)  Contraception:  OCP (POP)  Current Outpatient Medications on File Prior to Visit   Medication Sig Dispense Refill    clonazePAM (KLONOPIN) 1 MG tablet 1 tablet Orally h=Three times daily, 0.5 daily PRN 90 tablet 2    pregabalin (LYRICA) 100 MG capsule Take 1 capsule by mouth 3 times daily for 30 days.  Max Daily Amount: 300 mg 90 capsule 2    QUEtiapine (SEROQUEL) 300 MG tablet Take 1 tablet by mouth every evening 90 tablet 1    QUEtiapine (SEROQUEL) 50 MG tablet Take 1 tablet by mouth daily as needed (mood) 90 tablet 1    lithium (LITHOBID) 300 MG extended release tablet 2 tablets Orally twice daily 120 tablet 2    levothyroxine (SYNTHROID) 100 MCG tablet Take 1 tablet by mouth daily 30 tablet 5    amLODIPine (NORVASC) 5 MG tablet Take 1 tablet by mouth daily 90 tablet 2    lamoTRIgine (LAMICTAL) 200 MG tablet 1 tablet Orally once a day 30 tablet 3    norethindrone (ORTHO MICRONOR) 0.35 MG tablet Take 1 tablet by mouth daily 90 tablet 1    adalimumab (HUMIRA) 40 MG/0.8ML injection Inject 40 mg into the skin once      atorvastatin (LIPITOR) 10 MG tablet Take 1 tablet by mouth in the morning. 90 tablet 2    omeprazole (PRILOSEC) 40 MG delayed release capsule 1 capsule 30 minutes before morning meal Orally BID for 90 days      Acetaminophen (TYLENOL PO) Tylenol       No current facility-administered medications on file prior to visit. ROS:  Feeling well. No dyspnea or chest pain on exertion. No abdominal pain, change in bowel habits, black or bloody stools. No urinary tract symptoms. GYN ROS: she complains of UTI sx, urinary urgency/frequency. PHYSICAL EXAM:  Blood pressure 124/74, height 5' 2\" (1.575 m), weight 155 lb 12.8 oz (70.7 kg), last menstrual period 12/28/2022. The patient appears well, alert, oriented x 3, in no distress. No CVAT bilaterally  No suprapubic tenderness. ASSESSMENT:  Encounter Diagnoses   Name Primary? PMDD (premenstrual dysphoric disorder) Yes    Urinary urgency     Urinary frequency     Sensation of pressure in bladder area        PLAN:  All questions answered  Will check UA and urine cx to r/o UTI  If neg urine cx, consider pelvic floor PT to help with urinary urge incontinence. Rx micronor---will continue  Joaquim Carbajal 40 October. No orders of the defined types were placed in this encounter. Supervising physician is Dr. Steffany Betancourt.   30 min chart review, exam counseling and documentation

## 2023-02-22 DIAGNOSIS — Z30.9 ENCOUNTER FOR CONTRACEPTIVE MANAGEMENT, UNSPECIFIED TYPE: ICD-10-CM

## 2023-02-22 RX ORDER — ACETAMINOPHEN AND CODEINE PHOSPHATE 120; 12 MG/5ML; MG/5ML
1 SOLUTION ORAL DAILY
Qty: 90 TABLET | Refills: 3 | Status: SHIPPED | OUTPATIENT
Start: 2023-02-22 | End: 2023-08-21

## 2023-02-22 NOTE — TELEPHONE ENCOUNTER
Spouse called stating that patient was seen by [facility] yesterday and a prescription was to have been sent to the pharmacy but the prescription is not at preferred pharmacy. After further review prescription was not sent in yesterday. Spoke with [facility]. Ok to send in norethindrone po daily #90 with 3 refills. Prescription sent.         Orders Placed This Encounter    norethindrone (ORTHO MICRONOR) 0.35 MG tablet     Sig: Take 1 tablet by mouth daily     Dispense:  90 tablet     Refill:  3

## 2023-02-24 LAB
BACTERIA SPEC CULT: NORMAL
BACTERIA SPEC CULT: NORMAL
SERVICE CMNT-IMP: NORMAL

## 2023-02-27 ENCOUNTER — TELEPHONE (OUTPATIENT)
Dept: OBGYN CLINIC | Age: 48
End: 2023-02-27

## 2023-03-06 NOTE — PROGRESS NOTES
The patient is a 56 y.o. [id number] who is seen for a urine drop off and visit due to last culture being contaminated. Pt states still having urinary symptoms, urgency but denies dysuria. Last visit I had evaluated her for same sx---urine cx was contaminated. She returns for repeat CCU today. With neg UC, will plan to refer pelvic floor PT. She is back on her Micronor and feels much better taking it. She also reports decreased libido likely r/t medication use. HISTORY:    [id number]  Patient's last menstrual period was 01/30/2023 (approximate). Sexual History:  single partner, contraception - OCP (estrogen/progesterone)  Contraception:  OCP (estrogen/progesterone)  Current Outpatient Medications on File Prior to Visit   Medication Sig Dispense Refill    norethindrone (ORTHO MICRONOR) 0.35 MG tablet Take 1 tablet by mouth daily 90 tablet 3    clonazePAM (KLONOPIN) 1 MG tablet 1 tablet Orally h=Three times daily, 0.5 daily PRN 90 tablet 2    QUEtiapine (SEROQUEL) 300 MG tablet Take 1 tablet by mouth every evening 90 tablet 1    QUEtiapine (SEROQUEL) 50 MG tablet Take 1 tablet by mouth daily as needed (mood) 90 tablet 1    lithium (LITHOBID) 300 MG extended release tablet 2 tablets Orally twice daily 120 tablet 2    levothyroxine (SYNTHROID) 100 MCG tablet Take 1 tablet by mouth daily 30 tablet 5    amLODIPine (NORVASC) 5 MG tablet Take 1 tablet by mouth daily 90 tablet 2    lamoTRIgine (LAMICTAL) 200 MG tablet 1 tablet Orally once a day 30 tablet 3    adalimumab (HUMIRA) 40 MG/0.8ML injection Inject 40 mg into the skin once      atorvastatin (LIPITOR) 10 MG tablet Take 1 tablet by mouth in the morning. 90 tablet 2    Acetaminophen (TYLENOL PO) Tylenol      omeprazole (PRILOSEC) 40 MG delayed release capsule 1 capsule 30 minutes before morning meal Orally BID for 90 days      pregabalin (LYRICA) 100 MG capsule Take 1 capsule by mouth 3 times daily for 30 days.  Max Daily Amount: 300 mg 90 capsule 2     No current facility-administered medications on file prior to visit. ROS:  Feeling well. No dyspnea or chest pain on exertion. No abdominal pain, change in bowel habits, black or bloody stools. No urinary tract symptoms. GYN ROS: she complains of urinary urgency. PHYSICAL EXAM:  Blood pressure 120/72, weight 156 lb (70.8 kg), last menstrual period 01/30/2023. The patient appears well, alert, oriented x 3, in no distress. Exam deferred, talk only  UA grossly neg. ASSESSMENT:  Encounter Diagnoses   Name Primary? Screening for genitourinary condition     Urinary urgency Yes    Sensation of pressure in bladder area     Urinary frequency     Decreased libido        PLAN:  All questions answered  Diagnosis explained in detail, including differential  Check urine cx  UA unremarkable today  If neg urine cx, plan refer pelvic floor PT. Lengthy disc with pt on low libido. Suspect medication use could be contributory, however do not think she should change any of her medication regimen. Disc strategies to improve intimacy with partner--schedule sex, foreplay, toys etc.   Pt understands and agrees. Orders Placed This Encounter   Procedures    Culture, Urine     Standing Status:   Future     Number of Occurrences:   1     Standing Expiration Date:   3/7/2024     Order Specific Question:   Specify (ex-cath, midstream, cysto, etc)? Answer:   mid stream    AMB POC URINALYSIS DIP STICK MANUAL W/O MICRO         Supervising physician is Dr. Luis M Cody. Greater than 50% of the 20 minute visit were spent in counseling to the above topics.

## 2023-03-07 ENCOUNTER — OFFICE VISIT (OUTPATIENT)
Dept: OBGYN CLINIC | Age: 48
End: 2023-03-07

## 2023-03-07 VITALS — DIASTOLIC BLOOD PRESSURE: 72 MMHG | BODY MASS INDEX: 28.53 KG/M2 | SYSTOLIC BLOOD PRESSURE: 120 MMHG | WEIGHT: 156 LBS

## 2023-03-07 DIAGNOSIS — R35.0 URINARY FREQUENCY: ICD-10-CM

## 2023-03-07 DIAGNOSIS — R68.82 DECREASED LIBIDO: ICD-10-CM

## 2023-03-07 DIAGNOSIS — Z13.89 SCREENING FOR GENITOURINARY CONDITION: ICD-10-CM

## 2023-03-07 DIAGNOSIS — R39.89 SENSATION OF PRESSURE IN BLADDER AREA: ICD-10-CM

## 2023-03-07 DIAGNOSIS — R39.15 URINARY URGENCY: Primary | ICD-10-CM

## 2023-03-07 LAB
BILIRUBIN, URINE, POC: NEGATIVE
BLOOD URINE, POC: NEGATIVE
GLUCOSE URINE, POC: NEGATIVE
KETONES, URINE, POC: NEGATIVE
LEUKOCYTE ESTERASE, URINE, POC: NEGATIVE
NITRITE, URINE, POC: NEGATIVE
PH, URINE, POC: 7 (ref 4.6–8)
PROTEIN,URINE, POC: NEGATIVE
SPECIFIC GRAVITY, URINE, POC: 1.02 (ref 1–1.03)
URINALYSIS CLARITY, POC: CLEAR
URINALYSIS COLOR, POC: YELLOW
UROBILINOGEN, POC: NORMAL

## 2023-03-10 DIAGNOSIS — R35.0 URINARY FREQUENCY: Primary | ICD-10-CM

## 2023-03-10 DIAGNOSIS — N39.46 MIXED STRESS AND URGE URINARY INCONTINENCE: ICD-10-CM

## 2023-03-10 LAB
BACTERIA SPEC CULT: NORMAL
SERVICE CMNT-IMP: NORMAL

## 2023-08-20 SDOH — ECONOMIC STABILITY: TRANSPORTATION INSECURITY
IN THE PAST 12 MONTHS, HAS LACK OF TRANSPORTATION KEPT YOU FROM MEETINGS, WORK, OR FROM GETTING THINGS NEEDED FOR DAILY LIVING?: YES

## 2023-08-20 SDOH — ECONOMIC STABILITY: HOUSING INSECURITY
IN THE LAST 12 MONTHS, WAS THERE A TIME WHEN YOU DID NOT HAVE A STEADY PLACE TO SLEEP OR SLEPT IN A SHELTER (INCLUDING NOW)?: NO

## 2023-08-20 SDOH — ECONOMIC STABILITY: FOOD INSECURITY: WITHIN THE PAST 12 MONTHS, THE FOOD YOU BOUGHT JUST DIDN'T LAST AND YOU DIDN'T HAVE MONEY TO GET MORE.: OFTEN TRUE

## 2023-08-20 SDOH — ECONOMIC STABILITY: FOOD INSECURITY: WITHIN THE PAST 12 MONTHS, YOU WORRIED THAT YOUR FOOD WOULD RUN OUT BEFORE YOU GOT MONEY TO BUY MORE.: OFTEN TRUE

## 2023-08-20 SDOH — ECONOMIC STABILITY: INCOME INSECURITY: HOW HARD IS IT FOR YOU TO PAY FOR THE VERY BASICS LIKE FOOD, HOUSING, MEDICAL CARE, AND HEATING?: VERY HARD

## 2023-09-21 NOTE — PROGRESS NOTES
Patient presents today for a routine gynecological examination with no complaints. Needs refills on OCP 3 month supply    Doing well on micronor and wishes to continue. She reports intermittent pain RLQ for the past several months. She reports + constipation, has colonoscopy scheduled next wk. She is amenorrheic on micronor, not correlated with bleeding. She is not sexually active. +urinary frequency but no other urinary sx. Had previously rec pelvic floor PT but pt did not FU for treatment. Reports no libido which has been ongoing for a very long time. We disc most decreased libido challenges are very multifactorial.   She is on a variety of psych medications, consider involvement with libido. Rec she discuss this with psychiatry as well. OB History          2    Para   2    Term   2            AB        Living   2         SAB        IAB        Ectopic        Molar        Multiple        Live Births                  Last pap: 22 Neg, HPV Neg  Last mammo: Last mammogram 2 years ago in [place], [place]. Pt reports normal result. GYN History           No LMP recorded. (Menstrual status: Other - See Notes). Pt reports lasy cycle 4 months ago. Past Medical History:  Past Medical History:   Diagnosis Date    Acid reflux     Anxiety     Bipolar disorder [facility])     Dissociative identity disorder ([address])     Fibromyalgia     Hypertension     Hypothyroidism     Migraine     Panic attacks     Spondylosis of lumbar spine     Umbilical hernia        Past Surgical History:  Past Surgical History:   Procedure Laterality Date    HERNIA REPAIR  10/20/2020    SKIN GRAFT Left 2019       Allergies:    Allergies   Allergen Reactions    Bisoprolol      Other reaction(s): bradycardia 40's    Haldol [Haloperidol]      Other reaction(s): NECK GOES BACK    Lisinopril      Other reaction(s): aggitation    Lurasidone      Other reaction(s): STROKES    Peanut-Containing Drug Products Hives

## 2023-09-22 ENCOUNTER — OFFICE VISIT (OUTPATIENT)
Dept: OBGYN CLINIC | Age: 48
End: 2023-09-22

## 2023-09-22 VITALS — DIASTOLIC BLOOD PRESSURE: 84 MMHG | BODY MASS INDEX: 29.12 KG/M2 | WEIGHT: 159.2 LBS | SYSTOLIC BLOOD PRESSURE: 128 MMHG

## 2023-09-22 DIAGNOSIS — Z01.419 WELL WOMAN EXAM: Primary | ICD-10-CM

## 2023-09-22 DIAGNOSIS — Z13.89 SCREENING FOR GENITOURINARY CONDITION: ICD-10-CM

## 2023-09-22 DIAGNOSIS — R35.0 URINARY FREQUENCY: ICD-10-CM

## 2023-09-22 DIAGNOSIS — R10.2 PELVIC PAIN: ICD-10-CM

## 2023-09-22 DIAGNOSIS — Z12.31 SCREENING MAMMOGRAM FOR BREAST CANCER: ICD-10-CM

## 2023-11-29 ENCOUNTER — OFFICE VISIT (OUTPATIENT)
Dept: OBGYN CLINIC | Age: 48
End: 2023-11-29
Payer: MEDICARE

## 2023-11-29 VITALS
BODY MASS INDEX: 28.76 KG/M2 | SYSTOLIC BLOOD PRESSURE: 128 MMHG | HEIGHT: 62 IN | WEIGHT: 156.3 LBS | DIASTOLIC BLOOD PRESSURE: 82 MMHG

## 2023-11-29 DIAGNOSIS — Z11.3 SCREENING EXAMINATION FOR VENEREAL DISEASE: ICD-10-CM

## 2023-11-29 DIAGNOSIS — N89.8 VAGINAL IRRITATION: ICD-10-CM

## 2023-11-29 DIAGNOSIS — Z11.8 SCREENING FOR VIRAL AND CHLAMYDIAL DISEASES: ICD-10-CM

## 2023-11-29 DIAGNOSIS — Z30.9 ENCOUNTER FOR CONTRACEPTIVE MANAGEMENT, UNSPECIFIED TYPE: ICD-10-CM

## 2023-11-29 DIAGNOSIS — N89.8 VAGINAL LESION: Primary | ICD-10-CM

## 2023-11-29 DIAGNOSIS — Z11.59 SCREENING FOR VIRAL AND CHLAMYDIAL DISEASES: ICD-10-CM

## 2023-11-29 PROCEDURE — G8419 CALC BMI OUT NRM PARAM NOF/U: HCPCS | Performed by: NURSE PRACTITIONER

## 2023-11-29 PROCEDURE — 99214 OFFICE O/P EST MOD 30 MIN: CPT | Performed by: NURSE PRACTITIONER

## 2023-11-29 PROCEDURE — 4004F PT TOBACCO SCREEN RCVD TLK: CPT | Performed by: NURSE PRACTITIONER

## 2023-11-29 PROCEDURE — G8427 DOCREV CUR MEDS BY ELIG CLIN: HCPCS | Performed by: NURSE PRACTITIONER

## 2023-11-29 PROCEDURE — 3079F DIAST BP 80-89 MM HG: CPT | Performed by: NURSE PRACTITIONER

## 2023-11-29 PROCEDURE — G8484 FLU IMMUNIZE NO ADMIN: HCPCS | Performed by: NURSE PRACTITIONER

## 2023-11-29 PROCEDURE — 3074F SYST BP LT 130 MM HG: CPT | Performed by: NURSE PRACTITIONER

## 2023-11-29 RX ORDER — VALACYCLOVIR HYDROCHLORIDE 1 G/1
TABLET, FILM COATED ORAL
Qty: 20 TABLET | Refills: 0 | Status: SHIPPED | OUTPATIENT
Start: 2023-11-29

## 2023-11-29 RX ORDER — ACETAMINOPHEN AND CODEINE PHOSPHATE 120; 12 MG/5ML; MG/5ML
1 SOLUTION ORAL DAILY
Qty: 84 TABLET | Refills: 3 | Status: SHIPPED | OUTPATIENT
Start: 2023-11-29 | End: 2024-10-30

## 2023-11-29 NOTE — PROGRESS NOTES
The patient is a 53 y.o. [id number] who presents to the office to for presents to the office for internal and external vaginal lesions/bumps x past 3 days. Pt is not sexually active at this time. Denies use of new soaps/detergents. Pt states she was on an abx due to infection in her colon. Stopped abx 5 days ago. Lesions are painful. Denies drainage. +mild itching. + vaginal odor, ongoing x3d. She reports recently was sexually active with spouse. Wants STI Screen. HISTORY:    [id number]  No LMP recorded. (Menstrual status: Other - See Notes). - no menses in over a year per patient. Current Outpatient Medications on File Prior to Visit   Medication Sig Dispense Refill    linaclotide (LINZESS) 290 MCG CAPS capsule Take 1 capsule by mouth daily      clonazePAM (KLONOPIN) 1 MG tablet 1 tablet Orally h=Three times daily, 0.5 daily PRN 90 tablet 2    pregabalin (LYRICA) 100 MG capsule Take 1 capsule by mouth 3 times daily for 30 days. Max Daily Amount: 300 mg 90 capsule 2    QUEtiapine (SEROQUEL) 300 MG tablet Take 1 tablet by mouth every evening (Patient taking differently: Take 400 mg by mouth every evening) 90 tablet 1    QUEtiapine (SEROQUEL) 50 MG tablet Take 1 tablet by mouth daily as needed (mood) 90 tablet 1    lithium (LITHOBID) 300 MG extended release tablet 2 tablets Orally twice daily 120 tablet 2    levothyroxine (SYNTHROID) 100 MCG tablet Take 1 tablet by mouth daily 30 tablet 5    amLODIPine (NORVASC) 5 MG tablet Take 1 tablet by mouth daily 90 tablet 2    lamoTRIgine (LAMICTAL) 200 MG tablet 1 tablet Orally once a day 30 tablet 3    adalimumab (HUMIRA) 40 MG/0.8ML injection Inject 0.8 mLs into the skin once      atorvastatin (LIPITOR) 10 MG tablet Take 1 tablet by mouth in the morning.  90 tablet 2    Acetaminophen (TYLENOL PO) Tylenol      omeprazole (PRILOSEC) 40 MG delayed release capsule 1 capsule 30 minutes before morning meal Orally BID for 90 days       No current facility-administered

## 2023-11-30 LAB
HSV1 IGG SER IA-ACNC: 43.8 INDEX (ref 0–0.9)
HSV2 IGG SER IA-ACNC: <0.91 INDEX (ref 0–0.9)

## 2023-12-01 LAB
A VAGINAE DNA VAG QL NAA+PROBE: NORMAL SCORE
BVAB2 DNA VAG QL NAA+PROBE: NORMAL SCORE
C ALBICANS DNA VAG QL NAA+PROBE: NEGATIVE
C GLABRATA DNA VAG QL NAA+PROBE: NEGATIVE
C TRACH RRNA SPEC QL NAA+PROBE: NEGATIVE
MEGA1 DNA VAG QL NAA+PROBE: NORMAL SCORE
N GONORRHOEA RRNA SPEC QL NAA+PROBE: NEGATIVE
SPECIMEN SOURCE: NORMAL
T VAGINALIS RRNA SPEC QL NAA+PROBE: NEGATIVE

## 2023-12-02 LAB
HSV1 DNA SPEC QL NAA+PROBE: NEGATIVE
HSV2 DNA SPEC QL NAA+PROBE: NEGATIVE
SPECIMEN SOURCE: NORMAL

## 2023-12-04 ENCOUNTER — TELEPHONE (OUTPATIENT)
Dept: OBGYN CLINIC | Age: 48
End: 2023-12-04

## 2023-12-04 NOTE — TELEPHONE ENCOUNTER
Nurse contacted patient at [phone] to notify patient of lab results per provider. Nurse confirmed speaking with patient. Nurse explained purpose of call to patient. Patient voiced understanding. Nurse inquired patients overall well-being Patient states feeling ok this morning, states feeling tired. Nurse voiced understanding. Nurse reviewed lab results with patient. Patient voiced understanding of results. Patient inquired meaning of cold sores. Nurse Provided education on Cold sores known as Herpes Simplex Virus 1, and they can occur in the colder months. Patient voiced understanding and thanked Nurse. Nurse assessed patient for any new or worsening symptoms, patient reports feeling tired. Nurse advised patient to contact office for any new or worsening symptoms. Patient voiced understanding. Call ended warmly with patient.

## 2023-12-04 NOTE — TELEPHONE ENCOUNTER
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

## 2023-12-05 ENCOUNTER — TELEPHONE (OUTPATIENT)
Dept: OBGYN CLINIC | Age: 48
End: 2023-12-05

## 2023-12-05 DIAGNOSIS — Z30.9 ENCOUNTER FOR CONTRACEPTIVE MANAGEMENT, UNSPECIFIED TYPE: ICD-10-CM

## 2023-12-05 RX ORDER — ACETAMINOPHEN AND CODEINE PHOSPHATE 120; 12 MG/5ML; MG/5ML
1 SOLUTION ORAL DAILY
Qty: 84 TABLET | Refills: 3 | Status: SHIPPED | OUTPATIENT
Start: 2023-12-05 | End: 2024-11-05

## 2023-12-05 RX ORDER — VALACYCLOVIR HYDROCHLORIDE 1 G/1
TABLET, FILM COATED ORAL
Qty: 20 TABLET | Refills: 0 | Status: SHIPPED | OUTPATIENT
Start: 2023-12-05

## 2024-02-13 ENCOUNTER — TELEPHONE (OUTPATIENT)
Dept: OBGYN CLINIC | Age: 49
End: 2024-02-13

## 2024-02-13 NOTE — TELEPHONE ENCOUNTER
Pt lvm requesting refill on Micronor. Advised pt a year supply was sent to [facility] and to call and ask them for the refill. Pt v/u.

## 2024-06-05 DIAGNOSIS — Z30.9 ENCOUNTER FOR CONTRACEPTIVE MANAGEMENT, UNSPECIFIED TYPE: ICD-10-CM

## 2024-06-05 RX ORDER — ACETAMINOPHEN AND CODEINE PHOSPHATE 120; 12 MG/5ML; MG/5ML
1 SOLUTION ORAL DAILY
Qty: 84 TABLET | Refills: 1 | Status: SHIPPED | OUTPATIENT
Start: 2024-06-05 | End: 2024-11-20

## 2024-11-13 DIAGNOSIS — Z30.9 ENCOUNTER FOR CONTRACEPTIVE MANAGEMENT, UNSPECIFIED TYPE: ICD-10-CM

## 2024-11-13 RX ORDER — ACETAMINOPHEN AND CODEINE PHOSPHATE 120; 12 MG/5ML; MG/5ML
1 SOLUTION ORAL DAILY
Qty: 84 TABLET | Refills: 1 | OUTPATIENT
Start: 2024-11-13

## 2024-12-15 DIAGNOSIS — Z30.9 ENCOUNTER FOR CONTRACEPTIVE MANAGEMENT, UNSPECIFIED TYPE: ICD-10-CM

## 2024-12-16 RX ORDER — ACETAMINOPHEN AND CODEINE PHOSPHATE 120; 12 MG/5ML; MG/5ML
1 SOLUTION ORAL DAILY
Qty: 84 TABLET | Refills: 1 | OUTPATIENT
Start: 2024-12-16

## 2024-12-17 DIAGNOSIS — Z30.9 ENCOUNTER FOR CONTRACEPTIVE MANAGEMENT, UNSPECIFIED TYPE: ICD-10-CM

## 2024-12-17 RX ORDER — ACETAMINOPHEN AND CODEINE PHOSPHATE 120; 12 MG/5ML; MG/5ML
1 SOLUTION ORAL DAILY
Qty: 30 TABLET | Refills: 0 | Status: SHIPPED | OUTPATIENT
Start: 2024-12-17 | End: 2025-06-03

## 2024-12-17 NOTE — TELEPHONE ENCOUNTER
Pt called requesting refill of Micronor. Pt past due for annual - scheduled 1/7/25. Will send in one refill of med until comes in for annual.

## 2025-01-08 DIAGNOSIS — Z30.9 ENCOUNTER FOR CONTRACEPTIVE MANAGEMENT, UNSPECIFIED TYPE: ICD-10-CM

## 2025-01-09 DIAGNOSIS — Z30.9 ENCOUNTER FOR CONTRACEPTIVE MANAGEMENT, UNSPECIFIED TYPE: ICD-10-CM

## 2025-01-09 RX ORDER — ACETAMINOPHEN AND CODEINE PHOSPHATE 120; 12 MG/5ML; MG/5ML
1 SOLUTION ORAL DAILY
Qty: 84 TABLET | Refills: 1 | OUTPATIENT
Start: 2025-01-09

## 2025-01-09 RX ORDER — ACETAMINOPHEN AND CODEINE PHOSPHATE 120; 12 MG/5ML; MG/5ML
1 SOLUTION ORAL DAILY
Qty: 30 TABLET | Refills: 0 | Status: SHIPPED | OUTPATIENT
Start: 2025-01-09

## 2025-01-26 ENCOUNTER — PATIENT MESSAGE (OUTPATIENT)
Dept: OBGYN CLINIC | Age: 50
End: 2025-01-26

## 2025-01-26 DIAGNOSIS — R30.0 DYSURIA: Primary | ICD-10-CM

## 2025-01-26 SDOH — ECONOMIC STABILITY: INCOME INSECURITY: IN THE LAST 12 MONTHS, WAS THERE A TIME WHEN YOU WERE NOT ABLE TO PAY THE MORTGAGE OR RENT ON TIME?: NO

## 2025-01-26 SDOH — ECONOMIC STABILITY: TRANSPORTATION INSECURITY
IN THE PAST 12 MONTHS, HAS THE LACK OF TRANSPORTATION KEPT YOU FROM MEDICAL APPOINTMENTS OR FROM GETTING MEDICATIONS?: YES

## 2025-01-26 SDOH — ECONOMIC STABILITY: FOOD INSECURITY: WITHIN THE PAST 12 MONTHS, YOU WORRIED THAT YOUR FOOD WOULD RUN OUT BEFORE YOU GOT MONEY TO BUY MORE.: OFTEN TRUE

## 2025-01-26 SDOH — ECONOMIC STABILITY: FOOD INSECURITY: WITHIN THE PAST 12 MONTHS, THE FOOD YOU BOUGHT JUST DIDN'T LAST AND YOU DIDN'T HAVE MONEY TO GET MORE.: OFTEN TRUE

## 2025-01-27 NOTE — PROGRESS NOTES
Patient presents today for a routine gynecological examination with no complaints. Pt seen at ED for extreme abdominal/pelvic pain, dx with UTI was started on Keflex. Pt states she needs Diflucan as Keflex gave her a vaginal yeast infection.   She notes she was given vaginal miconazole but states only diflucan helps for yeast in the past, would like rx diflucan to help with yeast sx. C/o vaginal itching and irritation.    Sees pcp regularly.     Last pap: 2022  Last pap results: NILM; HPV Negative    Last mammogram: 2018  Last mammogram results: Benign    Last colonoscopy: 2020  Last colonoscopy results: Due in 10 years       OB History          2    Para   2    Term   2            AB        Living   2         SAB        IAB        Ectopic        Molar        Multiple        Live Births                      GYN History           No LMP recorded (lmp unknown). (Menstrual status: Other - See Notes).     Past Medical History:  Past Medical History:   Diagnosis Date    Acid reflux     Anxiety     Bipolar disorder (HCC)     Dissociative identity disorder (HCC)     Fibromyalgia     Hypertension     Hypothyroidism     Migraine     Panic attacks     Spondylosis of lumbar spine     Umbilical hernia        Past Surgical History:  Past Surgical History:   Procedure Laterality Date    HERNIA REPAIR  10/20/2020    SKIN GRAFT Left 2019       Allergies:   Allergies   Allergen Reactions    Bisoprolol      Other reaction(s): bradycardia 40's    Haldol [Haloperidol]      Other reaction(s): NECK GOES BACK    Lisinopril      Other reaction(s): aggitation    Lurasidone      Other reaction(s): STROKES    Peanut-Containing Drug Products Hives and Swelling    Sulfa Antibiotics      Other reaction(s): vomiting    Tizanidine      Other reaction(s): Mood Disturbance    Tricyclic Antidepressants Other (See Comments)       Medication History:  Current Outpatient Medications   Medication Sig Dispense Refill

## 2025-01-28 ENCOUNTER — OFFICE VISIT (OUTPATIENT)
Dept: OBGYN CLINIC | Age: 50
End: 2025-01-28
Payer: MEDICARE

## 2025-01-28 VITALS
SYSTOLIC BLOOD PRESSURE: 98 MMHG | BODY MASS INDEX: 32.31 KG/M2 | WEIGHT: 175.6 LBS | DIASTOLIC BLOOD PRESSURE: 68 MMHG | HEIGHT: 62 IN

## 2025-01-28 DIAGNOSIS — Z11.51 SCREENING FOR HUMAN PAPILLOMAVIRUS (HPV): ICD-10-CM

## 2025-01-28 DIAGNOSIS — Z01.419 WELL WOMAN EXAM: Primary | ICD-10-CM

## 2025-01-28 DIAGNOSIS — Z30.9 ENCOUNTER FOR CONTRACEPTIVE MANAGEMENT, UNSPECIFIED TYPE: ICD-10-CM

## 2025-01-28 DIAGNOSIS — Z12.31 SCREENING MAMMOGRAM FOR BREAST CANCER: ICD-10-CM

## 2025-01-28 DIAGNOSIS — B37.31 VAGINAL YEAST INFECTION: ICD-10-CM

## 2025-01-28 DIAGNOSIS — Z12.4 SCREENING FOR CERVICAL CANCER: ICD-10-CM

## 2025-01-28 PROCEDURE — G0101 CA SCREEN;PELVIC/BREAST EXAM: HCPCS | Performed by: NURSE PRACTITIONER

## 2025-01-28 PROCEDURE — 99213 OFFICE O/P EST LOW 20 MIN: CPT | Performed by: NURSE PRACTITIONER

## 2025-01-28 RX ORDER — ACETAMINOPHEN AND CODEINE PHOSPHATE 120; 12 MG/5ML; MG/5ML
1 SOLUTION ORAL DAILY
Qty: 90 TABLET | Refills: 3 | Status: SHIPPED | OUTPATIENT
Start: 2025-01-28

## 2025-01-28 RX ORDER — ALBUTEROL SULFATE 90 UG/1
2 INHALANT RESPIRATORY (INHALATION) EVERY 4 HOURS PRN
COMMUNITY

## 2025-01-28 RX ORDER — CEPHALEXIN 500 MG/1
CAPSULE ORAL
COMMUNITY
Start: 2025-01-25

## 2025-01-28 RX ORDER — MIRTAZAPINE 30 MG/1
30 TABLET, FILM COATED ORAL
COMMUNITY

## 2025-01-28 RX ORDER — ONDANSETRON 4 MG/1
TABLET, ORALLY DISINTEGRATING ORAL
COMMUNITY
Start: 2024-10-22

## 2025-01-28 RX ORDER — FLUCONAZOLE 150 MG/1
TABLET ORAL
Qty: 2 TABLET | Refills: 0 | Status: SHIPPED | OUTPATIENT
Start: 2025-01-28

## 2025-01-28 RX ORDER — ALBUTEROL SULFATE 0.83 MG/ML
SOLUTION RESPIRATORY (INHALATION)
COMMUNITY

## 2025-01-28 RX ORDER — LORAZEPAM 0.5 MG
2000 TABLET ORAL DAILY
COMMUNITY

## 2025-01-28 RX ORDER — ZOLPIDEM TARTRATE 10 MG/1
1 TABLET ORAL DAILY
COMMUNITY

## 2025-01-28 RX ORDER — PHENAZOPYRIDINE HYDROCHLORIDE 200 MG/1
TABLET, FILM COATED ORAL
COMMUNITY
Start: 2025-01-25

## 2025-01-28 RX ORDER — FLUTICASONE PROPIONATE 50 MCG
1 SPRAY, SUSPENSION (ML) NASAL DAILY PRN
COMMUNITY

## 2025-01-29 RX ORDER — PHENAZOPYRIDINE HYDROCHLORIDE 100 MG/1
100 TABLET, FILM COATED ORAL 3 TIMES DAILY PRN
Qty: 9 TABLET | Refills: 0 | Status: SHIPPED | OUTPATIENT
Start: 2025-01-29 | End: 2025-02-01

## 2025-02-04 LAB
COLLECTION METHOD: ABNORMAL
CYTOLOGIST CVX/VAG CYTO: ABNORMAL
CYTOLOGY CVX/VAG DOC THIN PREP: ABNORMAL
DATE OF LMP: ABNORMAL
HPV APTIMA: NEGATIVE
Lab: ABNORMAL
PAP SOURCE: ABNORMAL
PATH REPORT.FINAL DX SPEC: ABNORMAL
PATHOLOGIST CVX/VAG CYTO: ABNORMAL
PATHOLOGIST PROVIDED ICD: ABNORMAL
PREV TREATMENT: ABNORMAL
RECOM F/U CVX/VAG CYTO: ABNORMAL
STAT OF ADQ CVX/VAG CYTO-IMP: ABNORMAL

## 2025-02-19 NOTE — PROGRESS NOTES
The patient is a 49 y.o.  who is seen for follow up visit from 25 for prominent posterior lip vs polyp noted on exam.   Pap Smear 25 (ASCUS, HPV Negative)    Still having some pain with urination. She did have some frequency and urgency that improved.    Urine culture sent today. UA unremarkable in office today.    Denies any vaginal bleeding or vaginal pain.  Feels bloated for the past few weeks. Pain more so RLQ. Admits to constipation.   Has not had recent GYN ultrasound but had CT scan on 25- see report below  CT scan was performed for abdominal pressure and discomfort. She notes it overall has improved but still with lingering RLQ pain.     Family hx of ov cancer (Mat. Aunt) around age 60 so pt has concern her bloating could be ovarian in origin. Recent CT performed a month ago with no significant findings of uterus/adnexa.      CT Scan 25:  FINDINGS:     ABDOMEN:   . Liver: Within normal limits.   .  Biliary system: No radiopaque gallstones. No gallbladder wall thickening or pericholecystic fluid. No intrahepatic or extrahepatic biliary ductal dilatation.   .  Spleen: Within normal limits.   .  Pancreas: Within normal limits.    .  Adrenals: Within normal limits.    .  Kidneys: No hydronephrosis. Symmetric renal parenchymal perfusion. Similar simple cyst in the lower pole of the left kidney.   .  Stomach/bowel: No obstruction. Normal appendix.   .  Peritoneum/Extraperitoneum: No pneumoperitoneum. No free fluid or fluid collections.    .  Vascular: Patent abdominal aorta and major branch vessels, including celiac trunk, SMA, LUZ, and bilateral renal arteries. Patent portal veins, splenic vein, and SMV.     PELVIS:   . Bladder: Mild circumferential bladder wall thickening.   .  Ureters: No ureteral calculi or ureteral dilatation.   .  Reproductive organs: Uterus is present. No adnexal masses.     HISTORY:      Patient's last menstrual period was 2024 (approximate).  Sexual

## 2025-02-20 ENCOUNTER — OFFICE VISIT (OUTPATIENT)
Dept: OBGYN CLINIC | Age: 50
End: 2025-02-20
Payer: MEDICARE

## 2025-02-20 VITALS
HEIGHT: 62 IN | SYSTOLIC BLOOD PRESSURE: 136 MMHG | WEIGHT: 180.3 LBS | DIASTOLIC BLOOD PRESSURE: 88 MMHG | BODY MASS INDEX: 33.18 KG/M2

## 2025-02-20 DIAGNOSIS — R10.31 RLQ ABDOMINAL PAIN: Primary | ICD-10-CM

## 2025-02-20 DIAGNOSIS — Z01.419 NORMAL PELVIC EXAM: ICD-10-CM

## 2025-02-20 DIAGNOSIS — R30.0 DYSURIA: ICD-10-CM

## 2025-02-20 DIAGNOSIS — R14.0 ABDOMINAL BLOATING: ICD-10-CM

## 2025-02-20 LAB
BILIRUBIN, URINE, POC: NEGATIVE
BLOOD URINE, POC: NORMAL
GLUCOSE URINE, POC: NEGATIVE
KETONES, URINE, POC: NEGATIVE
LEUKOCYTE ESTERASE, URINE, POC: NORMAL
NITRITE, URINE, POC: NEGATIVE
PH, URINE, POC: 7 (ref 4.6–8)
PROTEIN,URINE, POC: NEGATIVE
SPECIFIC GRAVITY, URINE, POC: 1.02 (ref 1–1.03)
URINALYSIS CLARITY, POC: CLEAR
URINALYSIS COLOR, POC: NORMAL
UROBILINOGEN, POC: NORMAL

## 2025-02-20 PROCEDURE — 99214 OFFICE O/P EST MOD 30 MIN: CPT | Performed by: NURSE PRACTITIONER

## 2025-02-20 PROCEDURE — 81003 URINALYSIS AUTO W/O SCOPE: CPT | Performed by: NURSE PRACTITIONER

## 2025-02-20 PROCEDURE — 3075F SYST BP GE 130 - 139MM HG: CPT | Performed by: NURSE PRACTITIONER

## 2025-02-20 PROCEDURE — 3079F DIAST BP 80-89 MM HG: CPT | Performed by: NURSE PRACTITIONER

## 2025-02-22 LAB
BACTERIA SPEC CULT: NORMAL
SERVICE CMNT-IMP: NORMAL

## 2025-02-24 ENCOUNTER — TELEPHONE (OUTPATIENT)
Dept: OBGYN CLINIC | Age: 50
End: 2025-02-24

## 2025-03-07 NOTE — PROGRESS NOTES
The patient is a 49 y.o.  who is seen for evaluation of ovaries d/t RLQ pain. Pt has family hx of ovarian cancer (mat. Aunt)  She had prev c/o midline pressure  which has improved since her last visit but she still has some intermittent RLQ discomfort.     She is curious about proceeding with a hysterectomy with BSO as she has a hx of mental illness that has required many hospitalizations in the past that pt felt may have been related to PMDD. Pt wonders if hyst with BSO will help improve this. She notes mentally doing much better since she started POP, she is stable on micronor and has done very well on it with no exacerbations in mood changes that have required hospitalization.      US Findings from Today (03/10/2025):  TV GYN US PERFORMED for RLQ pain   Uterus is anteverted and appears normal   Endo= 1.9 mm. No intracavitary mass visualized.   ROV appears WNL with small calcifation noted that most likely represents a small calcified blood vessel vs small dermoid   (2mm).   LOV appears WNL.   No free fluid.   Uterine Volume: 39.850 cm³     CT Scan 25:  ABDOMEN:   . Liver: Within normal limits.   .  Biliary system: No radiopaque gallstones. No gallbladder wall thickening or pericholecystic fluid. No intrahepatic or extrahepatic biliary ductal dilatation.   .  Spleen: Within normal limits.   .  Pancreas: Within normal limits.    .  Adrenals: Within normal limits.    .  Kidneys: No hydronephrosis. Symmetric renal parenchymal perfusion. Similar simple cyst in the lower pole of the left kidney.   .  Stomach/bowel: No obstruction. Normal appendix.   .  Peritoneum/Extraperitoneum: No pneumoperitoneum. No free fluid or fluid collections.    .  Vascular: Patent abdominal aorta and major branch vessels, including celiac trunk, SMA, LUZ, and bilateral renal arteries. Patent portal veins, splenic vein, and SMV.     PELVIS:   . Bladder: Mild circumferential bladder wall thickening.   .  Ureters: No ureteral calculi

## 2025-03-10 ENCOUNTER — OFFICE VISIT (OUTPATIENT)
Dept: OBGYN CLINIC | Age: 50
End: 2025-03-10
Payer: MEDICARE

## 2025-03-10 ENCOUNTER — RESULTS FOLLOW-UP (OUTPATIENT)
Dept: OBGYN CLINIC | Age: 50
End: 2025-03-10

## 2025-03-10 ENCOUNTER — PROCEDURE VISIT (OUTPATIENT)
Dept: OBGYN CLINIC | Age: 50
End: 2025-03-10
Payer: MEDICARE

## 2025-03-10 ENCOUNTER — PATIENT MESSAGE (OUTPATIENT)
Dept: OBGYN CLINIC | Age: 50
End: 2025-03-10

## 2025-03-10 VITALS
DIASTOLIC BLOOD PRESSURE: 78 MMHG | BODY MASS INDEX: 33.82 KG/M2 | SYSTOLIC BLOOD PRESSURE: 118 MMHG | WEIGHT: 183.8 LBS | HEIGHT: 62 IN

## 2025-03-10 DIAGNOSIS — R14.0 ABDOMINAL BLOATING: ICD-10-CM

## 2025-03-10 DIAGNOSIS — R10.31 RLQ ABDOMINAL PAIN: ICD-10-CM

## 2025-03-10 DIAGNOSIS — Z12.11 ENCOUNTER FOR SCREENING COLONOSCOPY: Primary | ICD-10-CM

## 2025-03-10 DIAGNOSIS — Z80.41 FAMILY HISTORY OF OVARIAN CANCER: ICD-10-CM

## 2025-03-10 DIAGNOSIS — R10.2 PELVIC PAIN: Primary | ICD-10-CM

## 2025-03-10 PROCEDURE — 3078F DIAST BP <80 MM HG: CPT | Performed by: NURSE PRACTITIONER

## 2025-03-10 PROCEDURE — 99213 OFFICE O/P EST LOW 20 MIN: CPT | Performed by: NURSE PRACTITIONER

## 2025-03-10 PROCEDURE — 3074F SYST BP LT 130 MM HG: CPT | Performed by: NURSE PRACTITIONER

## 2025-03-10 PROCEDURE — 76830 TRANSVAGINAL US NON-OB: CPT | Performed by: OBSTETRICS & GYNECOLOGY

## 2025-03-10 NOTE — RESULT ENCOUNTER NOTE
Ultrasound today shows uterus with a volume of 39 mL.  Is anteverted and appears normal.  Endometrium 1.9 mm.  Right ovary has small calcification but otherwise normal.  This approximately 2 mm.  Left ovary appears normal.  Please see full report under imaging